# Patient Record
Sex: FEMALE | Race: WHITE | NOT HISPANIC OR LATINO | Employment: OTHER | URBAN - METROPOLITAN AREA
[De-identification: names, ages, dates, MRNs, and addresses within clinical notes are randomized per-mention and may not be internally consistent; named-entity substitution may affect disease eponyms.]

---

## 2017-11-20 ENCOUNTER — APPOINTMENT (OUTPATIENT)
Dept: PREADMISSION TESTING | Facility: HOSPITAL | Age: 77
End: 2017-11-20

## 2017-11-20 ENCOUNTER — HOSPITAL ENCOUNTER (OUTPATIENT)
Dept: GENERAL RADIOLOGY | Facility: HOSPITAL | Age: 77
Discharge: HOME | End: 2017-11-20
Admitting: ORTHOPAEDIC SURGERY

## 2017-11-20 VITALS — HEIGHT: 63 IN | BODY MASS INDEX: 36.84 KG/M2 | WEIGHT: 207.89 LBS

## 2017-11-20 LAB
ANION GAP SERPL CALCULATED.3IONS-SCNC: 8 MMOL/L (ref 3–11)
BUN BLD-MCNC: 16 MG/DL (ref 9–23)
BUN/CREAT SERPL: 20 (ref 7–25)
CALCIUM SPEC-SCNC: 9.2 MG/DL (ref 8.7–10.4)
CHLORIDE SERPL-SCNC: 110 MMOL/L (ref 99–109)
CO2 SERPL-SCNC: 22 MMOL/L (ref 20–31)
CREAT BLD-MCNC: 0.8 MG/DL (ref 0.6–1.3)
DEPRECATED RDW RBC AUTO: 44.7 FL (ref 37–54)
ERYTHROCYTE [DISTWIDTH] IN BLOOD BY AUTOMATED COUNT: 13.2 % (ref 11.3–14.5)
GFR SERPL CREATININE-BSD FRML MDRD: 70 ML/MIN/1.73
GLUCOSE BLD-MCNC: 108 MG/DL (ref 70–100)
HBA1C MFR BLD: 6.1 % (ref 4.8–5.6)
HCT VFR BLD AUTO: 47.6 % (ref 34.5–44)
HGB BLD-MCNC: 15.8 G/DL (ref 11.5–15.5)
MCH RBC QN AUTO: 30.6 PG (ref 27–31)
MCHC RBC AUTO-ENTMCNC: 33.2 G/DL (ref 32–36)
MCV RBC AUTO: 92.1 FL (ref 80–99)
PLATELET # BLD AUTO: 218 10*3/MM3 (ref 150–450)
PMV BLD AUTO: 10 FL (ref 6–12)
POTASSIUM BLD-SCNC: 4.5 MMOL/L (ref 3.5–5.5)
RBC # BLD AUTO: 5.17 10*6/MM3 (ref 3.89–5.14)
SODIUM BLD-SCNC: 140 MMOL/L (ref 132–146)
WBC NRBC COR # BLD: 6.82 10*3/MM3 (ref 3.5–10.8)

## 2017-11-20 PROCEDURE — 93005 ELECTROCARDIOGRAM TRACING: CPT

## 2017-11-20 PROCEDURE — 36415 COLL VENOUS BLD VENIPUNCTURE: CPT

## 2017-11-20 PROCEDURE — 93010 ELECTROCARDIOGRAM REPORT: CPT | Performed by: INTERNAL MEDICINE

## 2017-11-20 PROCEDURE — 83036 HEMOGLOBIN GLYCOSYLATED A1C: CPT | Performed by: ORTHOPAEDIC SURGERY

## 2017-11-20 PROCEDURE — 85027 COMPLETE CBC AUTOMATED: CPT | Performed by: ORTHOPAEDIC SURGERY

## 2017-11-20 PROCEDURE — 71020 HC CHEST PA AND LATERAL: CPT

## 2017-11-20 PROCEDURE — 80048 BASIC METABOLIC PNL TOTAL CA: CPT | Performed by: ORTHOPAEDIC SURGERY

## 2017-11-20 RX ORDER — PRAVASTATIN SODIUM 40 MG
40 TABLET ORAL DAILY
COMMUNITY

## 2017-11-20 NOTE — DISCHARGE INSTRUCTIONS
The following information and instructions were given:    NPO after MN except sips of water with routine prescribed medication (except blood thinner, diabetes, or weight reducing medication) unless otherwise instructed by your physician.  Do not eat, drink, smoke or chew gum after MN the night before surgery. This also includes no mints.    DO NOT shave for two days before your procedure.  Do not wear makeup.      DO NOT wear fingernail polish (gel/regular) and/or acrylic/artificial nails on the day of surgery.   If a patient had recent manicure and would rather not remove polish or artificial nails, then the minimum requirement is that the polish/artificial nails must be removed from the middle finger on each hand.      If patient was having surgery on an upper extremity, then the patient was instructed that fingernail polish/artificial fingernails must be removed for surgery.  NO EXCEPTIONS.      If patient was having surgery on a lower extremity, then the patient was instructed that toenail polish on both extremities must be removed for surgery.  NO EXCEPTIONS.    Remove all jewelry (advised to go to jeweler if unable to remove).  Jewelry especially rings can no longer be taped for surgery.    Leave anything you consider valuable at home.    Leave your suitcase in the car until after your surgery.    Bring the following with you (if applicable)   -picture ID and insurance cards   -Co-pay/deductible required by insurance   -Medications in the original bottles (not a list) including all over-the-counter  medications if not brought to PAT   -Copy of advance directive, living will or power of  documents if not  brought to PAT   -CPAP or BIPAP mask and tubing (do not bring machine)   -Skin prep instructions sheet   -PAT Pass    Education booklet, brochure, handout or binder given to patient.    Pain Control After Surgery handout given to patient.    Respirex use (handout given to patient) and pneumonia  prevention.    Signs and Symptoms of infection.    DVT Prevention stressing the importance of ambulation.    Patient to apply Chlorhexadine wipes to surgical area (as instructed) the night before procedure and the AM of procedure.

## 2017-11-28 ENCOUNTER — TRANSCRIBE ORDERS (OUTPATIENT)
Dept: ADMINISTRATIVE | Facility: HOSPITAL | Age: 77
End: 2017-11-28

## 2017-11-28 DIAGNOSIS — M19.012 ARTHRITIS OF LEFT SHOULDER REGION: Primary | ICD-10-CM

## 2017-11-30 ENCOUNTER — HOSPITAL ENCOUNTER (OUTPATIENT)
Dept: CT IMAGING | Facility: HOSPITAL | Age: 77
Discharge: HOME | End: 2017-11-30
Attending: ORTHOPAEDIC SURGERY | Admitting: ORTHOPAEDIC SURGERY

## 2017-11-30 DIAGNOSIS — M19.012 ARTHRITIS OF LEFT SHOULDER REGION: ICD-10-CM

## 2017-11-30 PROCEDURE — 73200 CT UPPER EXTREMITY W/O DYE: CPT

## 2017-12-03 ENCOUNTER — ANESTHESIA EVENT (OUTPATIENT)
Dept: PERIOP | Facility: HOSPITAL | Age: 77
End: 2017-12-03

## 2017-12-03 RX ORDER — PREGABALIN 75 MG/1
75 CAPSULE ORAL ONCE
Status: DISCONTINUED | OUTPATIENT
Start: 2017-12-04 | End: 2017-12-05 | Stop reason: HOSPADM

## 2017-12-03 RX ORDER — ACETAMINOPHEN 500 MG
1000 TABLET ORAL ONCE
Status: COMPLETED | OUTPATIENT
Start: 2017-12-04 | End: 2017-12-05

## 2017-12-04 ENCOUNTER — HOSPITAL ENCOUNTER (INPATIENT)
Facility: HOSPITAL | Age: 77
LOS: 1 days | Discharge: HOME | End: 2017-12-05
Attending: ORTHOPAEDIC SURGERY | Admitting: ORTHOPAEDIC SURGERY

## 2017-12-04 ENCOUNTER — APPOINTMENT (OUTPATIENT)
Dept: GENERAL RADIOLOGY | Facility: HOSPITAL | Age: 77
End: 2017-12-04

## 2017-12-04 ENCOUNTER — ANESTHESIA (OUTPATIENT)
Dept: PERIOP | Facility: HOSPITAL | Age: 77
End: 2017-12-04

## 2017-12-04 DIAGNOSIS — Z78.9 IMPAIRED MOBILITY AND ADLS: Primary | ICD-10-CM

## 2017-12-04 DIAGNOSIS — Z74.09 IMPAIRED MOBILITY AND ADLS: Primary | ICD-10-CM

## 2017-12-04 PROBLEM — Z96.612 S/P REVERSE TOTAL SHOULDER ARTHROPLASTY, LEFT: Status: ACTIVE | Noted: 2017-12-04

## 2017-12-04 PROBLEM — E78.5 HLD (HYPERLIPIDEMIA): Status: ACTIVE | Noted: 2017-12-04

## 2017-12-04 PROBLEM — R73.03 PREDIABETES: Status: ACTIVE | Noted: 2017-12-04

## 2017-12-04 LAB
GLUCOSE BLDC GLUCOMTR-MCNC: 195 MG/DL (ref 70–130)
GLUCOSE BLDC GLUCOMTR-MCNC: 244 MG/DL (ref 70–130)

## 2017-12-04 PROCEDURE — 0LS40ZZ REPOSITION LEFT UPPER ARM TENDON, OPEN APPROACH: ICD-10-PCS | Performed by: ORTHOPAEDIC SURGERY

## 2017-12-04 PROCEDURE — 25010000002 ROPIVACAINE HCL-NACL 0.2-0.9 % SOLUTION: Performed by: ANESTHESIOLOGY

## 2017-12-04 PROCEDURE — 0RRK00Z REPLACEMENT OF LEFT SHOULDER JOINT WITH REVERSE BALL AND SOCKET SYNTHETIC SUBSTITUTE, OPEN APPROACH: ICD-10-PCS | Performed by: ORTHOPAEDIC SURGERY

## 2017-12-04 PROCEDURE — 82962 GLUCOSE BLOOD TEST: CPT

## 2017-12-04 PROCEDURE — C1776 JOINT DEVICE (IMPLANTABLE): HCPCS | Performed by: ORTHOPAEDIC SURGERY

## 2017-12-04 PROCEDURE — 94799 UNLISTED PULMONARY SVC/PX: CPT

## 2017-12-04 PROCEDURE — 25010000002 VANCOMYCIN: Performed by: ORTHOPAEDIC SURGERY

## 2017-12-04 PROCEDURE — 25010000002 DEXAMETHASONE PER 1 MG: Performed by: NURSE ANESTHETIST, CERTIFIED REGISTERED

## 2017-12-04 PROCEDURE — 63710000001 INSULIN LISPRO (HUMAN) PER 5 UNITS: Performed by: NURSE PRACTITIONER

## 2017-12-04 PROCEDURE — 73030 X-RAY EXAM OF SHOULDER: CPT

## 2017-12-04 PROCEDURE — 25010000002 FENTANYL CITRATE (PF) 100 MCG/2ML SOLUTION: Performed by: NURSE ANESTHETIST, CERTIFIED REGISTERED

## 2017-12-04 PROCEDURE — 97530 THERAPEUTIC ACTIVITIES: CPT | Performed by: OCCUPATIONAL THERAPIST

## 2017-12-04 PROCEDURE — C1713 ANCHOR/SCREW BN/BN,TIS/BN: HCPCS | Performed by: ORTHOPAEDIC SURGERY

## 2017-12-04 PROCEDURE — 25010000003 CEFAZOLIN IN DEXTROSE 2-4 GM/100ML-% SOLUTION: Performed by: ORTHOPAEDIC SURGERY

## 2017-12-04 PROCEDURE — 97166 OT EVAL MOD COMPLEX 45 MIN: CPT | Performed by: OCCUPATIONAL THERAPIST

## 2017-12-04 PROCEDURE — 25010000002 FENTANYL CITRATE (PF) 100 MCG/2ML SOLUTION: Performed by: ANESTHESIOLOGY

## 2017-12-04 PROCEDURE — 25010000002 MIDAZOLAM PER 1 MG: Performed by: ANESTHESIOLOGY

## 2017-12-04 PROCEDURE — 25010000002 VANCOMYCIN 10 G RECONSTITUTED SOLUTION: Performed by: ORTHOPAEDIC SURGERY

## 2017-12-04 PROCEDURE — 25010000002 NEOSTIGMINE PER 0.5 MG: Performed by: NURSE ANESTHETIST, CERTIFIED REGISTERED

## 2017-12-04 PROCEDURE — 25010000002 ONDANSETRON PER 1 MG: Performed by: NURSE ANESTHETIST, CERTIFIED REGISTERED

## 2017-12-04 PROCEDURE — 25010000002 PROPOFOL 10 MG/ML EMULSION: Performed by: NURSE ANESTHETIST, CERTIFIED REGISTERED

## 2017-12-04 PROCEDURE — L3670 SO ACRO/CLAV CAN WEB PRE OTS: HCPCS | Performed by: ORTHOPAEDIC SURGERY

## 2017-12-04 DEVICE — GLENOSPHERE SHLDR/REV EQUINOXE 38MM: Type: IMPLANTABLE DEVICE | Site: SHOULDER | Status: FUNCTIONAL

## 2017-12-04 DEVICE — SCRW COMPR EQUINOXE LK 4.5X22MM: Type: IMPLANTABLE DEVICE | Site: SHOULDER | Status: FUNCTIONAL

## 2017-12-04 DEVICE — LINER HUM EQUINOXE REV SHLDR 38 PLS0: Type: IMPLANTABLE DEVICE | Site: SHOULDER | Status: FUNCTIONAL

## 2017-12-04 DEVICE — TOTL SHLDR AUG PLT ARTHROPLASTY UPCHRG: Type: IMPLANTABLE DEVICE | Site: SHOULDER | Status: FUNCTIONAL

## 2017-12-04 DEVICE — IMPLANTABLE DEVICE: Type: IMPLANTABLE DEVICE | Site: SHOULDER | Status: FUNCTIONAL

## 2017-12-04 DEVICE — SCRW COMPR EQUINOXE LK 4.5X26MM: Type: IMPLANTABLE DEVICE | Site: SHOULDER | Status: FUNCTIONAL

## 2017-12-04 DEVICE — SCRW LK EQUINOXE GLENOSPHERE REV/SHLDR: Type: IMPLANTABLE DEVICE | Site: SHOULDER | Status: FUNCTIONAL

## 2017-12-04 DEVICE — STEM HUM PRI EQUINOXE PRESSFIT 9MM: Type: IMPLANTABLE DEVICE | Site: SHOULDER | Status: FUNCTIONAL

## 2017-12-04 DEVICE — TRY HUM EQUINOXE ADPT REV SHLDR PLS0: Type: IMPLANTABLE DEVICE | Site: SHOULDER | Status: FUNCTIONAL

## 2017-12-04 DEVICE — SCRW EQUINOXE TORQ DEFINE REV SHLDR KT: Type: IMPLANTABLE DEVICE | Site: SHOULDER | Status: FUNCTIONAL

## 2017-12-04 DEVICE — SCRW COMPR EQUINOXE LK 4.5X18MM: Type: IMPLANTABLE DEVICE | Site: SHOULDER | Status: FUNCTIONAL

## 2017-12-04 DEVICE — PLT GLEN JNT EQUINOXE AUG POST 8DEG LT: Type: IMPLANTABLE DEVICE | Site: SHOULDER | Status: FUNCTIONAL

## 2017-12-04 RX ORDER — VANCOMYCIN/0.9 % SOD CHLORIDE 1.5G/250ML
15 PLASTIC BAG, INJECTION (ML) INTRAVENOUS ONCE
Status: COMPLETED | OUTPATIENT
Start: 2017-12-04 | End: 2017-12-04

## 2017-12-04 RX ORDER — LIDOCAINE HYDROCHLORIDE 10 MG/ML
INJECTION, SOLUTION INFILTRATION; PERINEURAL AS NEEDED
Status: DISCONTINUED | OUTPATIENT
Start: 2017-12-04 | End: 2017-12-04 | Stop reason: SURG

## 2017-12-04 RX ORDER — ROPIVACAINE IN 0.9% SOD CHL/PF 0.2% 545ML
6 ELASTOMERIC PUMP, HI VARIABLE RATE INJECTION CONTINUOUS
Status: DISCONTINUED | OUTPATIENT
Start: 2017-12-04 | End: 2017-12-05 | Stop reason: HOSPADM

## 2017-12-04 RX ORDER — NALOXONE HCL 0.4 MG/ML
0.1 VIAL (ML) INJECTION
Status: DISCONTINUED | OUTPATIENT
Start: 2017-12-04 | End: 2017-12-05 | Stop reason: HOSPADM

## 2017-12-04 RX ORDER — ATRACURIUM BESYLATE 10 MG/ML
INJECTION, SOLUTION INTRAVENOUS AS NEEDED
Status: DISCONTINUED | OUTPATIENT
Start: 2017-12-04 | End: 2017-12-04 | Stop reason: SURG

## 2017-12-04 RX ORDER — DEXAMETHASONE SODIUM PHOSPHATE 4 MG/ML
INJECTION, SOLUTION INTRA-ARTICULAR; INTRALESIONAL; INTRAMUSCULAR; INTRAVENOUS; SOFT TISSUE AS NEEDED
Status: DISCONTINUED | OUTPATIENT
Start: 2017-12-04 | End: 2017-12-04 | Stop reason: SURG

## 2017-12-04 RX ORDER — CEFAZOLIN SODIUM 2 G/100ML
2 INJECTION, SOLUTION INTRAVENOUS ONCE
Status: COMPLETED | OUTPATIENT
Start: 2017-12-04 | End: 2017-12-04

## 2017-12-04 RX ORDER — PROPOFOL 10 MG/ML
VIAL (ML) INTRAVENOUS CONTINUOUS PRN
Status: DISCONTINUED | OUTPATIENT
Start: 2017-12-04 | End: 2017-12-04 | Stop reason: SURG

## 2017-12-04 RX ORDER — GLYCOPYRROLATE 0.2 MG/ML
INJECTION INTRAMUSCULAR; INTRAVENOUS AS NEEDED
Status: DISCONTINUED | OUTPATIENT
Start: 2017-12-04 | End: 2017-12-04 | Stop reason: SURG

## 2017-12-04 RX ORDER — FAMOTIDINE 20 MG/1
20 TABLET, FILM COATED ORAL EVERY 12 HOURS SCHEDULED
Status: DISCONTINUED | OUTPATIENT
Start: 2017-12-04 | End: 2017-12-04 | Stop reason: HOSPADM

## 2017-12-04 RX ORDER — ONDANSETRON 2 MG/ML
4 INJECTION INTRAMUSCULAR; INTRAVENOUS ONCE AS NEEDED
Status: DISCONTINUED | OUTPATIENT
Start: 2017-12-04 | End: 2017-12-04 | Stop reason: HOSPADM

## 2017-12-04 RX ORDER — ONDANSETRON 2 MG/ML
4 INJECTION INTRAMUSCULAR; INTRAVENOUS EVERY 6 HOURS PRN
Status: DISCONTINUED | OUTPATIENT
Start: 2017-12-04 | End: 2017-12-05 | Stop reason: HOSPADM

## 2017-12-04 RX ORDER — LIDOCAINE HYDROCHLORIDE 10 MG/ML
0.5 INJECTION, SOLUTION EPIDURAL; INFILTRATION; INTRACAUDAL; PERINEURAL ONCE AS NEEDED
Status: COMPLETED | OUTPATIENT
Start: 2017-12-04 | End: 2017-12-04

## 2017-12-04 RX ORDER — SODIUM CHLORIDE, SODIUM LACTATE, POTASSIUM CHLORIDE, CALCIUM CHLORIDE 600; 310; 30; 20 MG/100ML; MG/100ML; MG/100ML; MG/100ML
9 INJECTION, SOLUTION INTRAVENOUS CONTINUOUS PRN
Status: DISCONTINUED | OUTPATIENT
Start: 2017-12-04 | End: 2017-12-04 | Stop reason: HOSPADM

## 2017-12-04 RX ORDER — SODIUM CHLORIDE, SODIUM LACTATE, POTASSIUM CHLORIDE, CALCIUM CHLORIDE 600; 310; 30; 20 MG/100ML; MG/100ML; MG/100ML; MG/100ML
INJECTION, SOLUTION INTRAVENOUS CONTINUOUS PRN
Status: DISCONTINUED | OUTPATIENT
Start: 2017-12-04 | End: 2017-12-04 | Stop reason: SURG

## 2017-12-04 RX ORDER — BISACODYL 5 MG/1
10 TABLET, DELAYED RELEASE ORAL DAILY PRN
Status: DISCONTINUED | OUTPATIENT
Start: 2017-12-04 | End: 2017-12-05 | Stop reason: HOSPADM

## 2017-12-04 RX ORDER — BUPIVACAINE HYDROCHLORIDE 2.5 MG/ML
INJECTION, SOLUTION EPIDURAL; INFILTRATION; INTRACAUDAL AS NEEDED
Status: DISCONTINUED | OUTPATIENT
Start: 2017-12-04 | End: 2017-12-04 | Stop reason: SURG

## 2017-12-04 RX ORDER — FAMOTIDINE 10 MG/ML
20 INJECTION, SOLUTION INTRAVENOUS EVERY 12 HOURS SCHEDULED
Status: DISCONTINUED | OUTPATIENT
Start: 2017-12-04 | End: 2017-12-04 | Stop reason: HOSPADM

## 2017-12-04 RX ORDER — SODIUM CHLORIDE 450 MG/100ML
50 INJECTION, SOLUTION INTRAVENOUS CONTINUOUS
Status: DISCONTINUED | OUTPATIENT
Start: 2017-12-04 | End: 2017-12-05 | Stop reason: HOSPADM

## 2017-12-04 RX ORDER — PROPOFOL 10 MG/ML
VIAL (ML) INTRAVENOUS AS NEEDED
Status: DISCONTINUED | OUTPATIENT
Start: 2017-12-04 | End: 2017-12-04 | Stop reason: SURG

## 2017-12-04 RX ORDER — SODIUM CHLORIDE 0.9 % (FLUSH) 0.9 %
1-10 SYRINGE (ML) INJECTION AS NEEDED
Status: DISCONTINUED | OUTPATIENT
Start: 2017-12-04 | End: 2017-12-04 | Stop reason: HOSPADM

## 2017-12-04 RX ORDER — ATORVASTATIN CALCIUM 10 MG/1
10 TABLET, FILM COATED ORAL NIGHTLY
Status: DISCONTINUED | OUTPATIENT
Start: 2017-12-04 | End: 2017-12-05 | Stop reason: HOSPADM

## 2017-12-04 RX ORDER — FENTANYL CITRATE 50 UG/ML
50 INJECTION, SOLUTION INTRAMUSCULAR; INTRAVENOUS
Status: DISCONTINUED | OUTPATIENT
Start: 2017-12-04 | End: 2017-12-04 | Stop reason: HOSPADM

## 2017-12-04 RX ORDER — HYDROMORPHONE HYDROCHLORIDE 1 MG/ML
0.5 INJECTION, SOLUTION INTRAMUSCULAR; INTRAVENOUS; SUBCUTANEOUS
Status: DISCONTINUED | OUTPATIENT
Start: 2017-12-04 | End: 2017-12-05 | Stop reason: HOSPADM

## 2017-12-04 RX ORDER — FENTANYL CITRATE 50 UG/ML
INJECTION, SOLUTION INTRAMUSCULAR; INTRAVENOUS AS NEEDED
Status: DISCONTINUED | OUTPATIENT
Start: 2017-12-04 | End: 2017-12-04 | Stop reason: SURG

## 2017-12-04 RX ORDER — HYDROCODONE BITARTRATE AND ACETAMINOPHEN 10; 325 MG/1; MG/1
1 TABLET ORAL EVERY 4 HOURS PRN
Status: DISCONTINUED | OUTPATIENT
Start: 2017-12-04 | End: 2017-12-05 | Stop reason: HOSPADM

## 2017-12-04 RX ORDER — DEXTROSE MONOHYDRATE 25 G/50ML
25 INJECTION, SOLUTION INTRAVENOUS
Status: DISCONTINUED | OUTPATIENT
Start: 2017-12-04 | End: 2017-12-05 | Stop reason: HOSPADM

## 2017-12-04 RX ORDER — DOCUSATE SODIUM 100 MG/1
100 CAPSULE, LIQUID FILLED ORAL 2 TIMES DAILY PRN
Status: DISCONTINUED | OUTPATIENT
Start: 2017-12-04 | End: 2017-12-05 | Stop reason: HOSPADM

## 2017-12-04 RX ORDER — SENNA AND DOCUSATE SODIUM 50; 8.6 MG/1; MG/1
2 TABLET, FILM COATED ORAL 2 TIMES DAILY
Status: DISCONTINUED | OUTPATIENT
Start: 2017-12-04 | End: 2017-12-05 | Stop reason: HOSPADM

## 2017-12-04 RX ORDER — NICOTINE POLACRILEX 4 MG
15 LOZENGE BUCCAL
Status: DISCONTINUED | OUTPATIENT
Start: 2017-12-04 | End: 2017-12-05 | Stop reason: HOSPADM

## 2017-12-04 RX ORDER — MIDAZOLAM HYDROCHLORIDE 1 MG/ML
INJECTION INTRAMUSCULAR; INTRAVENOUS AS NEEDED
Status: DISCONTINUED | OUTPATIENT
Start: 2017-12-04 | End: 2017-12-04 | Stop reason: SURG

## 2017-12-04 RX ORDER — ONDANSETRON 2 MG/ML
INJECTION INTRAMUSCULAR; INTRAVENOUS AS NEEDED
Status: DISCONTINUED | OUTPATIENT
Start: 2017-12-04 | End: 2017-12-04 | Stop reason: SURG

## 2017-12-04 RX ORDER — ESMOLOL HYDROCHLORIDE 10 MG/ML
INJECTION INTRAVENOUS AS NEEDED
Status: DISCONTINUED | OUTPATIENT
Start: 2017-12-04 | End: 2017-12-04 | Stop reason: SURG

## 2017-12-04 RX ORDER — MAGNESIUM HYDROXIDE 1200 MG/15ML
LIQUID ORAL AS NEEDED
Status: DISCONTINUED | OUTPATIENT
Start: 2017-12-04 | End: 2017-12-04 | Stop reason: HOSPADM

## 2017-12-04 RX ORDER — HYDRALAZINE HYDROCHLORIDE 20 MG/ML
10 INJECTION INTRAMUSCULAR; INTRAVENOUS EVERY 6 HOURS PRN
Status: DISCONTINUED | OUTPATIENT
Start: 2017-12-04 | End: 2017-12-05 | Stop reason: HOSPADM

## 2017-12-04 RX ADMIN — EPHEDRINE SULFATE 10 MG: 50 INJECTION INTRAMUSCULAR; INTRAVENOUS; SUBCUTANEOUS at 10:01

## 2017-12-04 RX ADMIN — FAMOTIDINE 20 MG: 20 TABLET, FILM COATED ORAL at 07:22

## 2017-12-04 RX ADMIN — ESMOLOL HYDROCHLORIDE 30 MG: 10 INJECTION, SOLUTION INTRAVENOUS at 09:28

## 2017-12-04 RX ADMIN — BUPIVACAINE HYDROCHLORIDE 20 ML: 2.5 INJECTION, SOLUTION EPIDURAL; INFILTRATION; INTRACAUDAL; PERINEURAL at 08:02

## 2017-12-04 RX ADMIN — LIDOCAINE HYDROCHLORIDE 50 MG: 10 INJECTION, SOLUTION INFILTRATION; PERINEURAL at 09:28

## 2017-12-04 RX ADMIN — SODIUM CHLORIDE, POTASSIUM CHLORIDE, SODIUM LACTATE AND CALCIUM CHLORIDE 9 ML/HR: 600; 310; 30; 20 INJECTION, SOLUTION INTRAVENOUS at 07:13

## 2017-12-04 RX ADMIN — GLYCOPYRROLATE 0.4 MG: 0.2 INJECTION, SOLUTION INTRAMUSCULAR; INTRAVENOUS at 10:40

## 2017-12-04 RX ADMIN — Medication 2 TABLET: at 17:23

## 2017-12-04 RX ADMIN — INSULIN LISPRO 3 UNITS: 100 INJECTION, SOLUTION INTRAVENOUS; SUBCUTANEOUS at 17:23

## 2017-12-04 RX ADMIN — ONDANSETRON 4 MG: 2 INJECTION INTRAMUSCULAR; INTRAVENOUS at 10:40

## 2017-12-04 RX ADMIN — Medication 6 ML/HR: at 11:05

## 2017-12-04 RX ADMIN — FENTANYL CITRATE 50 MCG: 50 INJECTION, SOLUTION INTRAMUSCULAR; INTRAVENOUS at 07:55

## 2017-12-04 RX ADMIN — DEXAMETHASONE SODIUM PHOSPHATE 8 MG: 4 INJECTION, SOLUTION INTRAMUSCULAR; INTRAVENOUS at 09:39

## 2017-12-04 RX ADMIN — EPHEDRINE SULFATE 15 MG: 50 INJECTION INTRAMUSCULAR; INTRAVENOUS; SUBCUTANEOUS at 09:42

## 2017-12-04 RX ADMIN — SODIUM CHLORIDE, POTASSIUM CHLORIDE, SODIUM LACTATE AND CALCIUM CHLORIDE: 600; 310; 30; 20 INJECTION, SOLUTION INTRAVENOUS at 09:22

## 2017-12-04 RX ADMIN — Medication 3 MG: at 10:40

## 2017-12-04 RX ADMIN — CEFAZOLIN SODIUM 2 G: 2 INJECTION, SOLUTION INTRAVENOUS at 07:32

## 2017-12-04 RX ADMIN — MIDAZOLAM HYDROCHLORIDE 1 MG: 1 INJECTION, SOLUTION INTRAMUSCULAR; INTRAVENOUS at 07:55

## 2017-12-04 RX ADMIN — LIDOCAINE HYDROCHLORIDE 0.2 ML: 10 INJECTION, SOLUTION EPIDURAL; INFILTRATION; INTRACAUDAL; PERINEURAL at 07:13

## 2017-12-04 RX ADMIN — PROPOFOL 150 MG: 10 INJECTION, EMULSION INTRAVENOUS at 09:28

## 2017-12-04 RX ADMIN — INSULIN LISPRO 2 UNITS: 100 INJECTION, SOLUTION INTRAVENOUS; SUBCUTANEOUS at 21:15

## 2017-12-04 RX ADMIN — SODIUM CHLORIDE 50 ML/HR: 4.5 INJECTION, SOLUTION INTRAVENOUS at 13:38

## 2017-12-04 RX ADMIN — VANCOMYCIN HYDROCHLORIDE 1500 MG: 10 INJECTION, POWDER, LYOPHILIZED, FOR SOLUTION INTRAVENOUS at 20:04

## 2017-12-04 RX ADMIN — PROPOFOL 133 MCG/KG/MIN: 10 INJECTION, EMULSION INTRAVENOUS at 09:28

## 2017-12-04 RX ADMIN — VANCOMYCIN HYDROCHLORIDE 1500 MG: 1 INJECTION, POWDER, LYOPHILIZED, FOR SOLUTION INTRAVENOUS at 08:41

## 2017-12-04 RX ADMIN — ATRACURIUM BESYLATE 50 MG: 10 INJECTION, SOLUTION INTRAVENOUS at 09:28

## 2017-12-04 RX ADMIN — FENTANYL CITRATE 50 MCG: 50 INJECTION INTRAMUSCULAR; INTRAVENOUS at 11:50

## 2017-12-04 NOTE — OP NOTE
DATE OF OPERATION: 12/04/17  PREOPERATIVE DIAGNOSIS: Left shoulder arthritis.    POSTOPERATIVE DIAGNOSES:  1. Left shoulder osteoarthritis with severe posterior glenoid wear  2. Biceps tenosynovitis.    PROCEDURES PERFORMED:  1. Left reverse total shoulder arthroplasty.    2. Left biceps tenodesis.    SURGEON: Carl Jarrett MD  ASSISTANTS:  1. MINDY Anand, medically necessary.    ANESTHESIA: General plus block.    ESTIMATED BLOOD LOSS:100mL.    COMPLICATIONS: None.    DISPOSITION: Recovery room in stable condition.    IMPLANTS: [employer]ech Equinoxe reverse total shoulder system, 9 mm stem press-fit, 0 metal liner tray, 38-0 polyethylene tray, posterior augmented baseplate with 4 screws with locking caps, and a 38mm glenosphere.    INDICATIONS: This is a 77-year-old female with left shoulder pain and limited function and motion secondary to arthritis. They have failed conservative treatment and after a discussion of risks, benefits, and alternatives, wished to proceed with shoulder arthroplasty.  DESCRIPTION OF PROCEDURE: On the day of surgery, the patient identified the left shoulder as the correct operative extremity. This was initialed by the surgeon with the patients's acknowledgment. The patient underwent placement of an interscalene block and was taken to the operating room and placed in the supine position. Upon induction of adequate anesthesia, the patient was brought up to the beach chair position and the shoulder and upper extremity were prepped and draped in the usual sterile fashion. Timeout confirmed the correct patient and operative extremity as well as that antibiotics were on board. A standard deltopectoral approach to the shoulder was carried out. It was carried sharply through the skin and subcutaneous tissue. Medial and lateral flaps were developed over the deltopectoral fascia. The cephalic vein was identified and mobilized medially with the deltoid. The subdeltoid and subpectoral spaces were  mobilized and a blunt retractor was placed deep to this. The clavipectoral fascia was opened on the lateral edge of the conjoined tendon and the retractor was moved deep to this. The leading edge of the pectoralis was released exposing the long head of the biceps. This was tenosynovitic. It was tenodesed to the pectoralis and released proximal to this. The clavipectoral fascia was opened on the lateral edge of conjoined tendon and the retractor moved deep to this. The 3 sisters were identified and coagulated. A subscapularis tenotomy was performed 1 cm medial to the lesser tuberosity and rotator interval was released to the glenoid exposing the humeral head. The inferior capsule was released directly off the humerus to allow greater than 90° of external rotation. The anatomic neck was exposed and the humeral head osteotomy was performed in approximately 25° of retroversion. The remainder of the osteophytes were removed. The canal was then entered, reamed, and broached. The final stem impacted in in approximately 25° of retroversion. A head protector was placed. The humerus was subluxed posteriorly. The glenoid exposed. Circumferential labral excision and capsular release were performed. A 270° mobilization of the subscapularis was carried out as well.  The subscap was of very poor quality and did not mobilize well.  This, combined with the nearly 20 degrees of posterior glenoid wear, led me to perform a reverse arthroplasty. A centering hole was drilled. The glenoid was gently reamed slightly eccentrically and then the large central hole for the baseplate was drilled and the cage glenoid baseplate impacted in.  Next, the inferior screw hole was drilled and a screw placed with excellent purchase.  Next, an mark-inferior screw was placed, then a postero-inferior screw, then an mark-superior screw placed with acceptable purchase in all.  Locking caps were placed. The glenosphere was then inserted and locked into  place with a set screw.  The humerus was carefully subluxed back anteriorly. A liner tray and polyethylene were placed and trialing was carried out. The appropriate final sizes were chosen and locked into place.  The shoulder was then reduced.  This allowed nearly full passive range of motion with no instability. The joint was copiously irrigated with orthopedic irrigation mixed with Betadine after the final implants were assembled and locked into place.  Passive range range of motion will be full elevation but external rotation will be limited to 30° in the perioperative period. The deltopectoral interval was approximated with 0 Vicryl, the subcutaneous tissue with 2-0 Vicryl, and the skin with Monocryl and Dermabond. A sterile dressing was placed. Anesthesia was reversed and the patient was taken to the recovery room in stable condition. All instrument, needle, and sponge counts were correct.      Carl Jarrett MD*

## 2017-12-04 NOTE — ANESTHESIA POSTPROCEDURE EVALUATION
Patient: Cheryle Newsome    Procedure Summary     Date Anesthesia Start Anesthesia Stop Room / Location    12/04/17 0922 1114 [facility] / [facility]       Procedure Diagnosis Surgeon Provider    TOTAL SHOULDER ARTHROPLASTY, POSS REVERSE, LEFT (Left Shoulder) Primary localized osteoarthrosis of shoulder, left; Tendonitis, bicipital, left  (L shoulder arthritis) MD Yan Adam MD          Anesthesia Type: general, regional  Last vitals  /69   Temp   97.0   Pulse   72   Resp   14    SpO2   92     Post Anesthesia Care and Evaluation    Patient location during evaluation: PACU  Patient participation: complete - patient cannot participate  Level of consciousness: sleepy but conscious  Pain score: 0  Pain management: adequate  Airway patency: patent  Anesthetic complications: No anesthetic complications  PONV Status: none  Cardiovascular status: hemodynamically stable and acceptable  Respiratory status: nonlabored ventilation, acceptable, nasal cannula and oral airway  Hydration status: acceptable

## 2017-12-04 NOTE — ANESTHESIA PREPROCEDURE EVALUATION
Anesthesia Evaluation     Patient summary reviewed and Nursing notes reviewed   no history of anesthetic complications:  NPO Solid Status: > 8 hours  NPO Liquid Status: > 2 hours     Airway   Mallampati: I  TM distance: >3 FB  Neck ROM: full  no difficulty expected  Dental - normal exam     Pulmonary    (+) decreased breath sounds,   Cardiovascular   Exercise tolerance: good (4-7 METS)    ECG reviewed  Rhythm: regular  Rate: normal    (+) hyperlipidemia      Neuro/Psych  GI/Hepatic/Renal/Endo    (+) obesity,    (-) morbid obesity    Musculoskeletal     Abdominal   (+) obese,     Abdomen: soft.   Substance History      OB/GYN          Other   (+) arthritis                                   Anesthesia Plan    ASA 2     general and regional     intravenous induction   Anesthetic plan and risks discussed with patient.    Plan discussed with CRNA.

## 2017-12-04 NOTE — PLAN OF CARE
Problem: Perioperative Period (Adult)  Goal: Signs and Symptoms of Listed Potential Problems Will be Absent or Manageable (Perioperative Period)  Outcome: Outcome(s) achieved Date Met:  12/04/17

## 2017-12-04 NOTE — ANESTHESIA PROCEDURE NOTES
Peripheral Block    Patient location during procedure: pre-op  Start time: 12/4/2017 7:55 AM  Stop time: 12/4/2017 8:05 AM  Reason for block: at surgeon's request and post-op pain management  Performed by  Anesthesiologist: HUNG NARANJO  Assisted by: LUIS E CUEVAS  Preanesthetic Checklist  Completed: patient identified, site marked, surgical consent, pre-op evaluation, timeout performed, IV checked, risks and benefits discussed and monitors and equipment checked  Prep:  Pt Position: supine  Sterile barriers:cap, gloves, mask and sterile barriers  Prep: ChloraPrep  Patient monitoring: blood pressure monitoring, continuous pulse oximetry and EKG  Procedure  Sedation:yes    Guidance:ultrasound guided  Images:still images obtained    Block Type:interscalene  Injection Technique:catheter  Needle Type:Tuohy and echogenic  Needle Gauge:18 G    Catheter Size:20 G (20g)  Medications  Local Injected:bupivacaine 0.25% Local Amount Injected:20mL  Post Assessment  Injection Assessment: negative aspiration for heme, no paresthesia on injection and incremental injection  Patient Tolerance:comfortable throughout block  Complications:no  Additional Notes  Procedure:                 The pt was placed in semifowlers position with a slight tilt of the thorax contralateral to the insertion site.  The Insertion Site was prepped and draped in sterile fashion.  The pt was anesthetized with  IV Sedation( see meds) and  Skin and cutaneous tissue was infiltrated and anesthetized with 1% Lidocaine 3 mls via a 25g needle.  Utilizing ultrasound guidance, a BBraun 2 inch 18 g Contiplex echogenic touhy needle was advanced in-plane.  Hydro dissection of tissue was achieved with Normal saline. Major vessels(carotid and Internal Jugular) where visualized as the brachial plexus was approached at the approximate level of C-7/ T-1.  Cervical 5 and Branches of Cervical 6 nerve roots where visualized and the needle tip was placed posterior at  the level of C-6 roots.  LA spread was visualized and injection was made incrementally every 5 mls with aspiration. Injection pressure was normal or little, there was no intraneural injection, no vascular injection.      The BBraun 20 g wire stylet  catheter was then placed under US guidance on the posterior aspect of the Brachial Plexus.  Location of catheter was confirmed with NS injection visualized with US . The tuohy was then removed and the skin was sealed with Skin AFix at catheter insertion site.  Skin was prepped with mastisol and the labeled catheter  was secured with steristrips and a CHG tegaderm. Thank You.

## 2017-12-04 NOTE — H&P
Patient Care Team:      Chief complaint:  L shoulder Pain  Subjective:    Patient is a 77 y.o.female presents with L Shoulder Pain   Review of Systems:  General ROS: negative  Cardiovascular ROS: no chest pain or dyspnea on exertion  Respiratory ROS: no cough, shortness of breath, or wheezing      Allergies: No Known Allergies       Latex: no  Contrast Dye: no    Home Meds    Prescriptions Prior to Admission   Medication Sig Dispense Refill Last Dose   • pravastatin (PRAVACHOL) 40 MG tablet Take 40 mg by mouth Daily.        PMH:   Past Medical History:   Diagnosis Date   • Arthritis    • High cholesterol    • History of depression     Related to anesthesia    • History of urinary tract surgery    • Obesity    • Osteoarthritis      PSH:    Past Surgical History:   Procedure Laterality Date   • HYSTERECTOMY     • JOINT REPLACEMENT Bilateral     Hips and Knees     Immunization History: pneumo: no   Flu: no  Tetanus ?  Social History:   Tobacco: no   Alcohol: no      Physical Exam:There were no vitals taken for this visit.       General Appearance:    Alert, obese, cooperative, no distress, appears stated age   Head:    Normocephalic, without obvious abnormality, atraumatic   Lungs:     Clear to auscultation bilaterally, respirations unlabored    Heart:  Regular rate and rhythm, S1 and S2 normal, no murmur, rub    or gallop    Abdomen:    Soft without tenderness   Breast Exam:    deferred   Genitalia:    deferred   Extremities:   Extremities normal, atraumatic, no cyanosis or edema   Skin:   Skin color, texture, turgor normal, no rashes or lesions   Neurologic:   Grossly intact       Cancer Patient: __ yes __no __unknown; If yes, clinical stage T:__ N:__M:__, stage group    Impression: Osteoarthritis L Shoulder    Plan: L PEDRO, Possible Reverse  Jonatan Gilliam PA-C 12/4/2017 7:03 AM

## 2017-12-04 NOTE — BRIEF OP NOTE
TOTAL SHOULDER ARTHROPLASTY  Progress Note    Cheryle Newsome  12/4/2017    Pre-op Diagnosis:   L shoulder arthritis       Post-Op Diagnosis Codes:     * Primary localized osteoarthrosis of shoulder, left [M19.012]     * Tendonitis, bicipital, left [M75.22]    Procedure/CPT® Codes:  OH RECONSTR TOTAL SHOULDER IMPLANT [93769]  OH REPAIR BICEPS LONG TENDON [50297]    Procedure(s):  TOTAL SHOULDER ARTHROPLASTY, POSS REVERSE, LEFT    Surgeon(s):  Carl Jarrett MD    Anesthesia: General with Block    Staff:   Circulator: Fernanda Parsons RN  Scrub Person: Birgit Rich  Vendor Representative: Alber Granda  Nursing Assistant: Lisa Jean Baptiste  Assistant: Jonatan Gilliam PA-C    Estimated Blood Loss: 100 mL    Urine Voided: 0 mL    Specimens:                None      Drains:           Findings: per dictation    Complications: cephalic vein ligation      Carl Jarrett MD     Date: 12/4/2017  Time: 10:44 AM

## 2017-12-04 NOTE — H&P
"Patient Name: Cheryle Newsome  MRN: [id number]  : 1940  DOS: 2017    Attending: Carl Jarrett MD    Primary Care Provider: Chivo Martini MD      Chief complaint:    Left shoulder Pain    Subjective   Patient is a 77 y.o. female presented for Left reverse total shoulder arthroplasty by Dr. Jarrett under GA. She tolerated surgery well and is admitted for further medical management.  Her shoulder has been painful for many years.  She has had limited range of motion.  Conservative treatments failed to provide long-term pain relief.    When seen postop she is doing well.  She denies pain. She denies nausea, shortness of breath or chest pain. No hx of DVT or PE.    ( Above is noted, agree.  Seen in her room afterwards.  She is doing well, good pain control, no other complaints.)wy       Allergies:  No Known Allergies    Meds:  Prescriptions Prior to Admission   Medication Sig Dispense Refill Last Dose   • pravastatin (PRAVACHOL) 40 MG tablet Take 40 mg by mouth Daily.   12/3/2017 at Unknown time       History:   Past Medical History:   Diagnosis Date   • Arthritis    • High cholesterol    • History of depression     Related to anesthesia    • History of urinary tract surgery    • Obesity    • Osteoarthritis      Past Surgical History:   Procedure Laterality Date   • HYSTERECTOMY     • JOINT REPLACEMENT Bilateral     Hips and Knees     History reviewed. No pertinent family history.  Social History   Substance Use Topics   • Smoking status: Never Smoker   • Smokeless tobacco: Never Used   • Alcohol use No    with one child.  She is retired from [employer].    Review of Systems  All systems were reviewed and negative except for:  Gastrointestinal: postitive for  constipation    Vital Signs  /96 (BP Location: Right arm, Patient Position: Lying)  Pulse 84  Temp 98.1 °F (36.7 °C) (Oral)   Resp 17  Ht 63\" (160 cm)  Wt 207 lb (93.9 kg)  SpO2 95%  BMI 36.67 kg/m2    Physical " Exam:    General Appearance:    Alert, cooperative, in no acute distress   Head:    Normocephalic, without obvious abnormality, atraumatic   Eyes:            Lids and lashes normal, conjunctivae and sclerae normal, no   icterus, no pallor, corneas clear, PERRLA   Ears:    Ears appear intact with no abnormalities noted   Neck:   No adenopathy, supple, trachea midline, no thyromegaly, no     carotid bruit, no JVD   Lungs:     Clear to auscultation,respirations regular, even and                   unlabored    Heart:    Regular rhythm and normal rate, normal S1 and S2, no            murmur, no gallop, no rub, no click   Abdomen:     Normal bowel sounds, no masses, no organomegaly, soft        non-tender, non-distended, no guarding, no rebound                 tenderness   Genitalia:    Deferred   Extremities:   Left shoulder Aquacel clean dry intact.  LUE in sling.  Good movement cap refill left digits.     Pulses:   Pulses palpable and equal bilaterally   Skin:   No bleeding, bruising or rash   Neurologic:   Cranial nerves 2 - 12 grossly intact, Numbness left digits.        I reviewed the patient's new clinical results.     Results for JOSE MEYER (MRN [id number]) as of 12/4/2017 13:43   Ref. Range 11/20/2017 10:56   Glucose Latest Ref Range: 70 - 100 mg/dL 108 (H)   Sodium Latest Ref Range: 132 - 146 mmol/L 140   Potassium Latest Ref Range: 3.5 - 5.5 mmol/L 4.5   CO2 Latest Ref Range: 20.0 - 31.0 mmol/L 22.0   Chloride Latest Ref Range: 99 - 109 mmol/L 110 (H)   Anion Gap Latest Ref Range: 3.0 - 11.0 mmol/L 8.0   Creatinine Latest Ref Range: 0.60 - 1.30 mg/dL 0.80   BUN Latest Ref Range: 9 - 23 mg/dL 16   BUN/Creatinine Ratio Latest Ref Range: 7.0 - 25.0  20.0   Calcium Latest Ref Range: 8.7 - 10.4 mg/dL 9.2   eGFR Non African Amer Latest Ref Range: >60 mL/min/1.73 70   Hemoglobin A1C Latest Ref Range: 4.80 - 5.60 % 6.10 (H)   WBC Latest Ref Range: 3.50 - 10.80 10*3/mm3 6.82   RBC Latest Ref Range: 3.89 - 5.14  10*6/mm3 5.17 (H)   Hemoglobin Latest Ref Range: 11.5 - 15.5 g/dL 15.8 (H)   Hematocrit Latest Ref Range: 34.5 - 44.0 % 47.6 (H)   RDW Latest Ref Range: 11.3 - 14.5 % 13.2   MCV Latest Ref Range: 80.0 - 99.0 fL 92.1   MCH Latest Ref Range: 27.0 - 31.0 pg 30.6   MCHC Latest Ref Range: 32.0 - 36.0 g/dL 33.2   MPV Latest Ref Range: 6.0 - 12.0 fL 10.0   Platelets Latest Ref Range: 150 - 450 10*3/mm3 218     Assessment and Plan:   Principal Problem:    S/P reverse total shoulder arthroplasty, left  Active Problems:    HLD (hyperlipidemia)    Prediabetes    Osteoarthritis, previous 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 PT/OT- LUE sling, pendulum exercises  2. Pain control-prns, interscalene nerve block  3. IS-encourage  4. DVT proph- [facility]  5. Bowel regimen  6. Resume home medications as appropriate  7. Monitor post-op labs  8. DC planning for home, likely tomorrow    HLD  - Continue statin    PreDM  - hgb A1c on 11/20/17 6.1  - Accuchecks ACHS with low dose SSI  - DM educator consult    Seen and examined by me. Agree with above. Discussed with patient and .monica Hooker MD  12/04/17  5:35 PM

## 2017-12-04 NOTE — ANESTHESIA PROCEDURE NOTES
Airway  Urgency: elective    Airway not difficult    General Information and Staff    Patient location during procedure: OR  CRNA: MALIKA MASSEY    Indications and Patient Condition  Indications for airway management: airway protection    Preoxygenated: yes  MILS not maintained throughout  Mask difficulty assessment: 1 - vent by mask    Final Airway Details  Final airway type: endotracheal airway      Successful airway: ETT  Cuffed: yes   Successful intubation technique: direct laryngoscopy  Facilitating devices/methods: intubating stylet  Endotracheal tube insertion site: oral  Blade: Mcfarlane  Blade size: #2  ETT size: 7.0 mm  Cormack-Lehane Classification: grade I - full view of glottis  Placement verified by: chest auscultation and capnometry   Measured from: lips  ETT to lips (cm): 20  Number of attempts at approach: 1    Additional Comments  Negative epigastric sounds, Breath sound equal bilaterally with symmetric chest rise and fall.  Teeth intact, atraumatic

## 2017-12-05 VITALS
TEMPERATURE: 97.7 F | WEIGHT: 207 LBS | SYSTOLIC BLOOD PRESSURE: 170 MMHG | RESPIRATION RATE: 16 BRPM | BODY MASS INDEX: 36.68 KG/M2 | DIASTOLIC BLOOD PRESSURE: 85 MMHG | HEIGHT: 63 IN | HEART RATE: 88 BPM | OXYGEN SATURATION: 94 %

## 2017-12-05 LAB
BASOPHILS # BLD AUTO: 0.01 10*3/MM3 (ref 0–0.2)
BASOPHILS NFR BLD AUTO: 0.1 % (ref 0–1)
DEPRECATED RDW RBC AUTO: 46.1 FL (ref 37–54)
EOSINOPHIL # BLD AUTO: 0 10*3/MM3 (ref 0–0.3)
EOSINOPHIL NFR BLD AUTO: 0 % (ref 0–3)
ERYTHROCYTE [DISTWIDTH] IN BLOOD BY AUTOMATED COUNT: 13.5 % (ref 11.3–14.5)
GLUCOSE BLDC GLUCOMTR-MCNC: 114 MG/DL (ref 70–130)
GLUCOSE BLDC GLUCOMTR-MCNC: 99 MG/DL (ref 70–130)
HCT VFR BLD AUTO: 40.2 % (ref 34.5–44)
HGB BLD-MCNC: 12.6 G/DL (ref 11.5–15.5)
IMM GRANULOCYTES # BLD: 0.01 10*3/MM3 (ref 0–0.03)
IMM GRANULOCYTES NFR BLD: 0.1 % (ref 0–0.6)
LYMPHOCYTES # BLD AUTO: 1.26 10*3/MM3 (ref 0.6–4.8)
LYMPHOCYTES NFR BLD AUTO: 14.9 % (ref 24–44)
MCH RBC QN AUTO: 29.1 PG (ref 27–31)
MCHC RBC AUTO-ENTMCNC: 31.3 G/DL (ref 32–36)
MCV RBC AUTO: 92.8 FL (ref 80–99)
MONOCYTES # BLD AUTO: 1.28 10*3/MM3 (ref 0–1)
MONOCYTES NFR BLD AUTO: 15.1 % (ref 0–12)
NEUTROPHILS # BLD AUTO: 5.9 10*3/MM3 (ref 1.5–8.3)
NEUTROPHILS NFR BLD AUTO: 69.8 % (ref 41–71)
PLATELET # BLD AUTO: 208 10*3/MM3 (ref 150–450)
PMV BLD AUTO: 10.4 FL (ref 6–12)
RBC # BLD AUTO: 4.33 10*6/MM3 (ref 3.89–5.14)
WBC NRBC COR # BLD: 8.46 10*3/MM3 (ref 3.5–10.8)

## 2017-12-05 PROCEDURE — 94799 UNLISTED PULMONARY SVC/PX: CPT

## 2017-12-05 PROCEDURE — 82962 GLUCOSE BLOOD TEST: CPT

## 2017-12-05 PROCEDURE — 85025 COMPLETE CBC W/AUTO DIFF WBC: CPT | Performed by: ORTHOPAEDIC SURGERY

## 2017-12-05 PROCEDURE — 97530 THERAPEUTIC ACTIVITIES: CPT | Performed by: OCCUPATIONAL THERAPIST

## 2017-12-05 RX ORDER — ROPIVACAINE IN 0.9% SOD CHL/PF 0.2% 545ML
6 ELASTOMERIC PUMP, HI VARIABLE RATE INJECTION CONTINUOUS
Start: 2017-12-05

## 2017-12-05 RX ORDER — HYDROCODONE BITARTRATE AND ACETAMINOPHEN 10; 325 MG/1; MG/1
1 TABLET ORAL EVERY 4 HOURS PRN
Qty: 30 TABLET | Refills: 0 | Status: SHIPPED | COMMUNITY
Start: 2017-12-05 | End: 2017-12-14

## 2017-12-05 RX ORDER — PSEUDOEPHEDRINE HCL 30 MG
1 TABLET ORAL 2 TIMES DAILY PRN
Qty: 60 CAPSULE | Refills: 0 | Status: SHIPPED | OUTPATIENT
Start: 2017-12-05

## 2017-12-05 RX ADMIN — DOCUSATE SODIUM 100 MG: 100 CAPSULE, LIQUID FILLED ORAL at 08:31

## 2017-12-05 RX ADMIN — ACETAMINOPHEN 1000 MG: 500 TABLET ORAL at 07:32

## 2017-12-05 NOTE — PLAN OF CARE
Problem: Patient Care Overview (Adult)  Goal: Plan of Care Review  Outcome: Ongoing (interventions implemented as appropriate)    12/04/17 1940   Coping/Psychosocial Response Interventions   Plan Of Care Reviewed With patient;spouse   Patient Care Overview   Progress improving   Outcome Evaluation   Outcome Summary/Follow up Plan Pt c/o pain 1/10 at axilla region and ON-Q running at 6. No desaturation on RA post ambulation. Pt passed mobility screen, please DC PT order and pt in agreement. Pt tolerated PROM , IR chest and ER 30 with good teachback from spouse. Recommend DC home with assist from spouse when medically ready.          Problem: Inpatient Occupational Therapy  Goal: Transfer Training Goal 1 LTG- OT  Outcome: Outcome(s) achieved Date Met:  12/04/17 12/04/17 1940   Transfer Training OT LTG   Transfer Training OT LTG, Date Established 12/04/17   Transfer Training OT LTG, Time to Achieve by discharge   Transfer Training OT LTG, Activity Type sit to stand/stand to sit;toilet   Transfer Training OT LTG, [place] Level verbal cues required;contact guard assist   Transfer Training OT LTG, Outcome goal met       Goal: Range of Motion Goal LTG- OT  Outcome: Ongoing (interventions implemented as appropriate)    12/04/17 1940   Range of Motion OT LTG   Range of Motion Goal OT LTG, Date Established 12/04/17   Range of Motion Goal OT LTG, Time to Achieve by discharge   Range of Motion Goal OT LTG, AROM Measure Pt and spouse will complete LUE HEP within physician parameters with supervision in preparation for safe DC home.       Goal: Patient Education Goal LTG- OT  Outcome: Ongoing (interventions implemented as appropriate)    12/04/17 1940   Patient Education OT LTG   Patient Education OT LTG, Date Established 12/04/17   Patient Education OT LTG, Time to Achieve by discharge   Patient Education OT LTG, Education Type written program;HEP;precautions per surgeon;1 hand/niranjan technique;home safety   Patient  Education OT LTG, Education Understanding demonstrates adequately;verbalizes understanding       Goal: UB Dressing Goal LTG- OT  Outcome: Ongoing (interventions implemented as appropriate)    12/04/17 1940   UB Dressing OT LTG   UB Dressing Goal OT LTG, Date Established 12/04/17   UB Dressing Goal OT LTG, Time to Achieve by discharge   UB Dressing Goal OT LTG, Activity Type Pt and spouse will don/doff LUE sling with min assist in preparation for safe DC home

## 2017-12-05 NOTE — THERAPY DISCHARGE NOTE
Acute Care - Occupational Therapy Treatment Note/Discharge   [place]     Patient Name: Cheryle Newsome  : 1940  MRN: [id number]  Today's Date: 2017  Onset of Illness/Injury or Date of Surgery Date: 17  Date of Referral to OT: 17  Referring Physician: Dr. Jarrett      Admit Date: 2017    Visit Dx:     ICD-10-CM ICD-9-CM   1. Impaired mobility and ADLs Z74.09 799.89     Patient Active Problem List   Diagnosis   • S/P reverse total shoulder arthroplasty, left   • HLD (hyperlipidemia)   • Prediabetes             Adult Rehabilitation Note       17 1045          Rehab Assessment/Intervention    Discipline occupational therapist  -AR      Document Type therapy note (daily note);discharge summary   POD#1 left RTSA  -AR      Subjective Information no complaints;agree to therapy  -AR      Patient Effort, Rehab Treatment excellent  -AR      Symptoms Noted During/After Treatment other (see comments)   left ptosis noted, spouse concerned-RN notified  -AR      Precautions/Limitations non-weight bearing status;shoulder precautions;other (see comments)   interscalene nerve catheter  -AR      Equipment Issued to Patient bathing equipment;bedside commode   LH sponge, reacher  -AR      Recorded by [AR] Kalee Albarado OT      Vital Signs    Pre SpO2 (%) 94  -AR      O2 Delivery Pre Treatment room air  -AR      Intra SpO2 (%) 93  -AR      O2 Delivery Intra Treatment room air  -AR      Post SpO2 (%) 95  -AR      O2 Delivery Post Treatment room air  -AR      Recorded by [AR] Kalee Albarado OT      Pain Assessment    Pain Assessment 0-10  -AR      Pain Score 1  -AR      Post Pain Score 1  -AR      Pain Type Acute pain  -AR      Pain Location Shoulder  -AR      Pain Orientation Left  -AR      Pain Intervention(s) Repositioned;Ambulation/increased activity  -AR      Response to Interventions tolerated  -AR      Recorded by [AR] Kalee Albarado OT      Cognitive Assessment/Intervention     "Current Cognitive/Communication Assessment functional  -AR      Orientation Status oriented x 4  -AR      Follows Commands/Answers Questions 100% of the time;able to follow multi-step instructions  -AR      Personal Safety WNL/WFL  -AR      Personal Safety Interventions gait belt;nonskid shoes/slippers when out of bed;supervised activity  -AR      Recorded by [AR] Kalee Albarado OT      Mobility Assessment/Training    Extremity Weight-Bearing Status left upper extremity  -AR      Left Upper Extremity Weight-Bearing non weight-bearing  -AR      Recorded by [AR] Kalee Albarado OT      Bed Mobility, Assessment/Treatment    Bed Mobility, Comment pt up in chair and anticipates sleeping in recliner  -AR      Recorded by [AR] Kalee Albarado OT      Transfer Assessment/Treatment    Transfers, Sit-Stand [place] independent  -AR      Transfers, Stand-Sit [place] independent  -AR      Recorded by [AR] Kalee Albarado OT      Stairs Assessment/Treatment    Number of Stairs 5  -AR      Stairs, Handrail Location none  -AR      Stairs, [place] Level independent  -AR      Stairs, Technique Used step over step (ascending);step over step (descending)  -AR      Recorded by [AR] Kalee Albarado OT      Functional Mobility    Functional Mobility- Ind. Level independent  -AR      Functional Mobility-Distance (Feet) 300  -AR      Functional Mobility- Comment No dyspnea, no desaturation noted however pt c/o feeling as if left nostril was \"stopped up\"  -AR      Recorded by [AR] Kalee Albarado OT      Positioning and Restraints    Pre-Treatment Position sitting in chair/recliner  -AR      Post Treatment Position chair  -AR      In Chair sitting;call light within reach;encouraged to call for assist;with family/caregiver;with nsg;with brace  -AR      Recorded by [AR] Kalee Albarado OT        User Key  (r) = Recorded By, (t) = Taken By, (c) = Cosigned By    Initials Name Effective Dates    AR " Kalee Albarado OT 06/22/15 -                 OT Goals       12/05/17 1518 12/04/17 1940       Transfer Training OT LTG    Transfer Training OT LTG, Date Established  12/04/17  -AR     Transfer Training OT LTG, Time to Achieve  by discharge  -AR     Transfer Training OT LTG, Activity Type  sit to stand/stand to sit;toilet  -AR     Transfer Training OT LTG, [place] Level  verbal cues required;contact guard assist  -AR     Transfer Training OT LTG, Outcome  goal met  -AR     Range of Motion OT LTG    Range of Motion Goal OT LTG, Date Established  12/04/17  -AR     Range of Motion Goal OT LTG, Time to Achieve  by discharge  -AR     Range of Motion Goal OT LTG, AROM Measure  Pt and spouse will complete LUE HEP within physician parameters with supervision in preparation for safe DC home.  -AR     Range of Motion Goal OT LTG, Outcome goal met  -AR      Patient Education OT LTG    Patient Education OT LTG, Date Established  12/04/17  -AR     Patient Education OT LTG, Time to Achieve  by discharge  -AR     Patient Education OT LTG, Education Type  written program;HEP;precautions per surgeon;1 hand/niranjan technique;home safety  -AR     Patient Education OT LTG, Education Understanding  demonstrates adequately;verbalizes understanding  -AR     Patient Education OT LTG Outcome goal met  -AR      UB Dressing OT LTG    UB Dressing Goal OT LTG, Date Established  12/04/17  -AR     UB Dressing Goal OT LTG, Time to Achieve  by discharge  -AR     UB Dressing Goal OT LTG, Activity Type  Pt and spouse will don/doff LUE sling with min assist in preparation for safe DC home  -AR     UB Dressing Goal OT LTG, Outcome goal met  -AR        User Key  (r) = Recorded By, (t) = Taken By, (c) = Cosigned By    Initials Name Provider Type    AR Kalee Albarado, OT Occupational Therapist          Occupational Therapy Education     Title: PT OT SLP Therapies (Done)     Topic: Occupational Therapy (Done)     Point: ADL training (Done)     Description: Instruct learner(s) on proper safety adaptation and remediation techniques during self care or transfers.   Instruct in proper use of assistive devices.    Learning Progress Summary    Learner Readiness Method Response Comment Documented by Status   Patient VIDHYA Guadarrama D,H ABISAI,THAD reviewed L shudler precautions, ADL retraining to maintain, care of ON-Q ball with ADLS, LUE HEP, NWB LUE, home safety AR 12/05/17 1516 Done    VIDHYA Guadarrama D,H VU,NR L: shoulder precautions, ADL retraining to maintain, sling management, care of On-Q ball with ADLS, LUE HEP, NWB LUE, bed mobility, transfer training AR 12/04/17 1938 Done   Significant Other VIDHYA Guadarrama D,H ABISAI,THAD reviewed L shudler precautions, ADL retraining to maintain, care of ON-Q ball with ADLS, LUE HEP, NWB LUE, home safety AR 12/05/17 1516 Done    VIDHYA Guadarrama D,H VU,NR L: shoulder precautions, ADL retraining to maintain, sling management, care of On-Q ball with ADLS, LUE HEP, NWB LUE, bed mobility, transfer training AR 12/04/17 1938 Done               Point: Home exercise program (Done)    Description: Instruct learner(s) on appropriate technique for monitoring, assisting and/or progressing therapeutic exercises/activities.    Learning Progress Summary    Learner Readiness Method Response Comment Documented by Status   Patient VIDHYA Guadarrama D,H ABISAI,THAD reviewed L shudler precautions, ADL retraining to maintain, care of ON-Q ball with ADLS, LUE HEP, NWB LUE, home safety AR 12/05/17 1516 Done    VIDHYA Guadarrama D,H VU,NR L: shoulder precautions, ADL retraining to maintain, sling management, care of On-Q ball with ADLS, LUE HEP, NWB LUE, bed mobility, transfer training AR 12/04/17 1938 Done   Significant Other VIDHYA Guadarrama D,H ABISAI,VU reviewed L shudler precautions, ADL retraining to maintain, care of ON-Q ball with ADLS, LUE HEP, NWB LUE, home safety AR 12/05/17 1516 Done    VIDHYA Guadarrama D,H VU,NR L: shoulder precautions, ADL retraining to maintain, sling management, care of On-Q  ball with ADLS, LUE HEP, NWB LUE, bed mobility, transfer training AR 12/04/17 1938 Done               Point: Precautions (Done)    Description: Instruct learner(s) on prescribed precautions during self-care and functional transfers.    Learning Progress Summary    Learner Readiness Method Response Comment Documented by Status   Patient VIDHYA Guadarrama D,H ABISAI,THAD reviewed L shudler precautions, ADL retraining to maintain, care of ON-Q ball with ADLS, LUE HEP, NWB LUE, home safety AR 12/05/17 1516 Done    VIDHYA Guadarrama D,H VU,NR L: shoulder precautions, ADL retraining to maintain, sling management, care of On-Q ball with ADLS, LUE HEP, NWB LUE, bed mobility, transfer training AR 12/04/17 1938 Done   Significant Other VIDHYA Guadarrama D,H ABISAI,THAD reviewed L shudler precautions, ADL retraining to maintain, care of ON-Q ball with ADLS, LUE HEP, NWB LUE, home safety AR 12/05/17 1516 Done    VIDHYA Guadarrama D,H VU,NR L: shoulder precautions, ADL retraining to maintain, sling management, care of On-Q ball with ADLS, LUE HEP, NWB LUE, bed mobility, transfer training AR 12/04/17 1938 Done               Point: Body mechanics (Done)    Description: Instruct learner(s) on proper positioning and spine alignment during self-care, functional mobility activities and/or exercises.    Learning Progress Summary    Learner Readiness Method Response Comment Documented by Status   Patient VIDHYA Guadarrama D,H ABISAI,THAD reviewed L shudler precautions, ADL retraining to maintain, care of ON-Q ball with ADLS, LUE HEP, NWB LUE, home safety AR 12/05/17 1516 Done    VIDHYA Guadarrama D,H VU,NR L: shoulder precautions, ADL retraining to maintain, sling management, care of On-Q ball with ADLS, LUE HEP, NWB LUE, bed mobility, transfer training AR 12/04/17 1938 Done   Significant Other VIDHYA Guadarrama D,H ABISAI,VU reviewed L shudler precautions, ADL retraining to maintain, care of ON-Q ball with ADLS, LUE HEP, NWB LUE, home safety AR 12/05/17 1516 Done    Teofilo OVIEDO,VIDHYA,BRENNON,H THAD,NR L: shoulder  precautions, ADL retraining to maintain, sling management, care of On-Q ball with ADLS, LUE HEP, NWB LUE, bed mobility, transfer training AR 12/04/17 1938 Done                      User Key     Initials Effective Dates Name Provider Type Discipline    AR 06/22/15 -  Kalee Albarado OT Occupational Therapist OT                OT Recommendation and Plan  Anticipated Discharge Disposition: home with assist  Therapy Frequency: daily (per priority policy)  Plan of Care Review  Plan Of Care Reviewed With: patient  Progress: improving  Outcome Summary/Follow up Plan: Pt and spouse anticipate DC home today and report feeling comfortable with this plan. Pt and spouse demonstrated good teachback with LUE HEP and sling management. She tolerated PROM , IR chest and ER 30. ON-Q running at 6. Pt with noticeable left ptosis, nurse notified. No dyspnea or desaturation with ambulation on RA. Spouse will be away overnight next week, educated pt on SROM and self application of sling.           Outcome Measures       12/05/17 1045 12/04/17 1633       How much help from another is currently needed...    Putting on and taking off regular lower body clothing? 2  -AR 2  -AR     Bathing (including washing, rinsing, and drying) 3  -AR 3  -AR     Toileting (which includes using toilet bed pan or urinal) 3  -AR 3  -AR     Putting on and taking off regular upper body clothing 2  -AR 2  -AR     Taking care of personal grooming (such as brushing teeth) 3  -AR 3  -AR     Eating meals 3  -AR 3  -AR     Score 16  -AR 16  -AR     Functional Assessment    Outcome Measure Options AM-PAC 6 Clicks Daily Activity (OT)  -AR AM-PAC 6 Clicks Daily Activity (OT)  -AR       User Key  (r) = Recorded By, (t) = Taken By, (c) = Cosigned By    Initials Name Provider Type    AR Kalee Albarado OT Occupational Therapist           Time Calculation:          Time Calculation- OT       12/05/17 1523          Time Calculation- OT    OT Start Time 1045  -AR       Total Timed Code Minutes- OT 90 minute(s)  -AR      OT Received On 12/05/17  -AR      OT Goal Re-Cert Due Date 12/14/17  -AR        User Key  (r) = Recorded By, (t) = Taken By, (c) = Cosigned By    Initials Name Provider Type    JENA Albarado OT Occupational Therapist          Therapy Charges for Today     Code Description Service Date Service Provider Modifiers Qty    38546441628 HC OT EVAL MOD COMPLEXITY 4 12/4/2017 Kalee Albarado OT GO 1    45045795369 HC OT THER SUPP EA 15 MIN 12/4/2017 Kalee Albarado OT GO 2    78931810266 HC OT THERAPEUTIC ACT EA 15 MIN 12/4/2017 Kalee Albarado OT GO 2    12619628480 HC OT THERAPEUTIC ACT EA 15 MIN 12/5/2017 Kalee Albarado OT GO 6               OT Discharge Summary  Anticipated Discharge Disposition: home with assist  Reason for Discharge: Discharge from facility  Outcomes Achieved: Able to achieve all goals within established timeline  Discharge Destination: Home with assist    Kalee Albarado OT  12/5/2017

## 2017-12-05 NOTE — PROGRESS NOTES
[facility]    Acute pain service Inpatient Progress Note    Patient Name: Cheryle Newsome  :  1940  MRN:  [id number]        Acute Pain  Service Inpatient Progress Note:    Analgesia:Excellent  Pain Score:2/10  LOC: alert and awake  Resp Status: room air  Cardiac: VS stable  Side Effects:None  Catheter Site:clean  Cath type: peripheral nerve cath with ON Q  Infusion rate: 6ml/hr  Catheter Plan:Catheter to remain Insitu and Continue catheter infusion rate unchanged

## 2017-12-05 NOTE — PLAN OF CARE
Problem: Patient Care Overview (Adult)  Goal: Plan of Care Review  Outcome: Ongoing (interventions implemented as appropriate)    12/05/17 1518   Coping/Psychosocial Response Interventions   Plan Of Care Reviewed With patient   Patient Care Overview   Progress improving   Outcome Evaluation   Outcome Summary/Follow up Plan Pt and spouse anticipate DC home today and report feeling comfortable with this plan. Pt and spouse demonstrated good teachback with LUE HEP and sling management. She tolerated PROM , IR chest and ER 30. ON-Q running at 6. Pt with noticeable left ptosis, nurse notified. No dyspnea or desaturation with ambulation on RA. Spouse will be away overnight next week, educated pt on SROM and self application of sling.          Problem: Inpatient Occupational Therapy  Goal: Range of Motion Goal LTG- OT  Outcome: Outcome(s) achieved Date Met:  12/05/17 12/04/17 1940 12/05/17 1518   Range of Motion OT LTG   Range of Motion Goal OT LTG, Date Established 12/04/17 --    Range of Motion Goal OT LTG, Time to Achieve by discharge --    Range of Motion Goal OT LTG, AROM Measure Pt and spouse will complete LUE HEP within physician parameters with supervision in preparation for safe DC home. --    Range of Motion Goal OT LTG, Outcome --  goal met       Goal: Patient Education Goal LTG- OT  Outcome: Outcome(s) achieved Date Met:  12/05/17 12/04/17 1940 12/05/17 1518   Patient Education OT LTG   Patient Education OT LTG, Date Established 12/04/17 --    Patient Education OT LTG, Time to Achieve by discharge --    Patient Education OT LTG, Education Type written program;HEP;precautions per surgeon;1 hand/niranjan technique;home safety --    Patient Education OT LTG, Education Understanding demonstrates adequately;verbalizes understanding --    Patient Education OT LTG Outcome --  goal met       Goal: UB Dressing Goal LTG- OT  Outcome: Outcome(s) achieved Date Met:  12/05/17 12/04/17 1940 12/05/17 1518   UB  Dressing OT LTG   UB Dressing Goal OT LTG, Date Established 12/04/17 --    UB Dressing Goal OT LTG, Time to Achieve by discharge --    UB Dressing Goal OT LTG, Activity Type Pt and spouse will don/doff LUE sling with min assist in preparation for safe DC home --    UB Dressing Goal OT LTG, Outcome --  goal met

## 2017-12-05 NOTE — PROGRESS NOTES
Orthopedic Daily Progress Note      CC: None    Pain well controlled  General: no fevers, chills  Abdomen: no nausea, vomiting, or diarrhea    No other complaints    Physical Exam:  I have reviewed the vital signs.  Temp:  [96.5 °F (35.8 °C)-98.2 °F (36.8 °C)] 97.7 °F (36.5 °C)  Heart Rate:  [57-88] 88  Resp:  [16-20] 16  BP: (143-172)/(65-96) 170/85    Objective  General Appearance:    Alert, cooperative, no distress  Extremities: No clubbing, cyanosis, or edema to lower extremities  Pulses:  2+ in distal surgical extremity  Skin: Dressing Clean/dry/intact      Results Review:    I have reviewed the labs, radiology results and diagnostic studies:yes      Results from last 7 days  Lab Units 12/05/17  0606   WBC 10*3/mm3 8.46   HEMOGLOBIN g/dL 12.6   PLATELETS 10*3/mm3 208           I have reviewed the medications.    Assessment/Problem List  POD# 1 S/p Left reverse TSA    Plan  PT/OT  D/c to home      Discharge Planning: I expect patient to be discharged to home today.    Carl Jarrett MD  12/05/17  7:44 AM

## 2017-12-05 NOTE — DISCHARGE SUMMARY
Patient Name: Cheryle Newsome  MRN: [id number]  : 1940  DOS: 2017    Attending: Carl Jarrett MD    Primary Care Provider: Chivo Martini MD    Date of Admission:.2017  5:27 AM    Date of Discharge:  2017    Discharge Diagnosis: Principal Problem:    S/P reverse total shoulder arthroplasty, left  Active Problems:    HLD (hyperlipidemia)    Prediabetes    Osteoarthritis, previous bilateral total knee arthroplasties and bilateral hip arthroplasties    Hospital Course  Patient is a 77 y.o. female presented for Left reverse total shoulder arthroplasty by Dr. Jarrett under [place]. She tolerated surgery well and was admitted for further medical management.  Her shoulder has been painful for many years.  She has had limited range of motion.  Conservative treatments failed to provide long-term pain relief.      Patient was provided pain medications as needed for pain control, along with interscalene nerve block infusion of Ropivacaine.      The patient was seen by OT and was taught exercises for her left arm.  The patient used an IS for atelectasis prophylaxis and mechanicals for DVT prophylaxis.  Home medications were resumed as appropriate, and labs were monitored and remained fairly stable.     With the progress she has made, she is ready for DC home today.      The patient will have an On Q pump ( instructed on it during this admit)  Discussed with patient regarding plan and she shows understanding and agreement.        Procedures Performed  DATE OF OPERATION: 17  PREOPERATIVE DIAGNOSIS: Left shoulder arthritis.    POSTOPERATIVE DIAGNOSES:  1. Left shoulder osteoarthritis with severe posterior glenoid wear  2. Biceps tenosynovitis.    PROCEDURES PERFORMED:  1. Left reverse total shoulder arthroplasty.    2. Left biceps tenodesis.    SURGEON: Carl Jarrett MD       Pertinent Test Results:    I reviewed the patient's new clinical results.     Results from last 7 days  Lab Units  "17  0606   WBC 10*3/mm3 8.46   HEMOGLOBIN g/dL 12.6   HEMATOCRIT % 40.2   PLATELETS 10*3/mm3 208     Results for CHERYLE MEYER (MRN [id number]) as of 2017 10:50   Ref. Range 2017 17:10 2017 20:44 2017 06:06 2017 07:00   Glucose Latest Ref Range: 70 - 130 mg/dL 244 (H) 195 (H)  114     I reviewed the patient's new imaging including images and reports.      Physical therapy: Pt c/o pain 1/10 at axilla region and ON-Q running at 6. No desaturation on RA post ambulation. Pt passed mobility screen, please DC PT order and pt in agreement. Pt tolerated PROM , IR chest and ER 30 with good teachback from spouse. Recommend DC home with assist from spouse when medically ready.     Discharge Assessment:    Vital Signs  /85  Pulse 88  Temp 97.7 °F (36.5 °C)  Resp 16  Ht 160 cm (63\")  Wt 93.9 kg (207 lb)  SpO2 94%  BMI 36.67 kg/m2  Temp (24hrs), Av.9 °F (36.6 °C), Min:96.5 °F (35.8 °C), Max:98.2 °F (36.8 °C)      General Appearance:    Alert, cooperative, in no acute distress   Lungs:     Clear to auscultation,respirations regular, even and                   unlabored    Heart:    Regular rhythm and normal rate, normal S1 and S2   Abdomen:     Normal bowel sounds, no masses, no organomegaly, soft        non-tender, non-distended, no guarding, no rebound                 tenderness   Extremities:   Left shoulder Aquacel clean dry intact.  LUE in sling.  Good movement cap refill left digits.   Pulses:   Pulses palpable and equal bilaterally   Skin:   No bleeding, bruising or rash   Neurologic:   Cranial nerves 2 - 12 grossly intact, sensation intact       Discharge Disposition: Home    Discharge Medications   Cheryle Meyer   Home Medication Instructions STUART:[id number]    Printed on:17 1216   Medication Information                      docusate sodium 100 MG capsule  Take 1 capsule by mouth 2 (Two) Times a Day As Needed for Constipation.           "   HYDROcodone-acetaminophen (NORCO)  MG per tablet  Take 1 tablet by mouth Every 4 (Four) Hours As Needed for Moderate Pain for up to 9 days.             pravastatin (PRAVACHOL) 40 MG tablet  Take 40 mg by mouth Daily.             Ropivacaine HCl-NaCl (NAROPIN) 0.2-0.9 %  12 mg/hr by Peripheral Nerve route Continuous.                 Discharge Diet: Consistent carb diet    Activity at Discharge: LUE sling, pendulum exercises    Follow-up Appointments  Dr. Jarrett per his orders    Seen and examined by me. Agree with above. Discussed with patient and her .monica Hooker MD  12/05/17  12:15 PM

## 2017-12-05 NOTE — THERAPY EVALUATION
Acute Care - Occupational Therapy Initial Evaluation  [facility]     Patient Name: Cheryle Newsome  : 1940  MRN: [id number]  Today's Date: 2017  Onset of Illness/Injury or Date of Surgery Date: 17  Date of Referral to OT: 17  Referring Physician: Dr. Jarrett    Admit Date: 2017       ICD-10-CM ICD-9-CM   1. Impaired mobility and ADLs Z74.09 799.89     Patient Active Problem List   Diagnosis   • S/P reverse total shoulder arthroplasty, left   • HLD (hyperlipidemia)   • Prediabetes     Past Medical History:   Diagnosis Date   • Arthritis    • High cholesterol    • History of depression     Related to anesthesia    • History of urinary tract surgery    • Obesity    • Osteoarthritis      Past Surgical History:   Procedure Laterality Date   • HYSTERECTOMY     • JOINT REPLACEMENT Bilateral     Hips and Knees          OT ASSESSMENT FLOWSHEET (last 72 hours)      OT Evaluation       17 1633 17 1252 17 0714          Rehab Evaluation    Document Type evaluation;therapy note (daily note)  -AR        Subjective Information no complaints;agree to therapy  -AR        Patient Effort, Rehab Treatment excellent  -AR        Symptoms Noted During/After Treatment none  -AR        General Information    Patient Profile Review yes  -AR        Onset of Illness/Injury or Date of Surgery Date 17  -AR        Referring Physician Dr. Jarrett  -AR        General Observations Pt up in chair, spouse prsent for teaching  -AR        Pertinent History Of Current Problem Pt is a 77 yof admitted for surgical management of progressive left shoudler pain and dysfunction that has failed to improve with conservative measures. Pt is POD#0 left RTSA and OT orders indicate PROM FE no limit, IR chest, ER 30; AROM elbow/wrist/hand and scapular retractions. PTA, pt reports difficulty reaching overhead to complete grooming, she required assist from spouse to don shirts and bra. Sleep was impaired by pain.    -AR        Precautions/Limitations fall precautions;non-weight bearing status;shoulder precautions;other (see comments);insensate limb   interscalene nerve catheter  -AR        Prior Level of Function independent:;all household mobility;community mobility;gait;transfer;min assist:;ADL's;bathing;dressing  -AR        Equipment Currently Used at Home cane, straight;raised toilet;walker, rolling   was not using AD PTA  -AR  raised toilet  -NM      Plans/Goals Discussed With patient and family;agreed upon  -AR        Risks Reviewed patient and family:;LOB;nausea/vomiting;dizziness;increased discomfort;change in vital signs;increased drainage;lines disloged  -AR        Benefits Reviewed patient and family:;improve function;increase independence;increase strength;increase balance;decrease pain;decrease risk of DVT;increase knowledge  -AR        Barriers to Rehab none identified  -AR        Living Environment    Lives With spouse  -AR  spouse  -NM      Living Arrangements house  -AR  house  -NM      Home Accessibility stairs to enter home;stairs within home;bed and bath are not on the first floor  -AR        Number of Stairs to Enter Home 5  -AR        Number of Stairs Within Home 5  -AR        Stair Railings at Home inside, present at both sides;outside, present at both sides  -AR        Transportation Available car;family or friend will provide  -AR  car;family or friend will provide  -NM      Living Environment Comment Pt lives spouse who can assist as needed  -AR        Clinical Impression    Date of Referral to OT 12/04/17  -AR        OT Diagnosis decreased independence with ADLs  -AR        Patient/Family Goals Statement raise LUE overhead  -AR        Criteria for Skilled Therapeutic Interventions Met yes;treatment indicated  -AR        Rehab Potential good, to achieve stated therapy goals  -AR        Therapy Frequency daily   per priority policy  -AR        Anticipated Discharge Disposition home with assist  -AR         Functional Level Prior    Ambulation  0-->independent  -JA 0-->independent  -NM      Transferring  0-->independent  -JA 0-->independent  -NM      Toileting  0-->independent  -JA 0-->independent  -NM      Bathing  0-->independent  -JA 2-->assistive person  -NM      Dressing  0-->independent  -JA 2-->assistive person  -NM      Eating  0-->independent  -JA 0-->independent  -NM      Communication  0-->understands/communicates without difficulty  -JA 0-->understands/communicates without difficulty  -NM      Swallowing  0-->swallows foods/liquids without difficulty  -JA 0-->swallows foods/liquids without difficulty  -NM      Prior Functional Level Comment  independent  -JA       Vital Signs    Intra SpO2 (%) 95  -AR        O2 Delivery Intra Treatment room air   post ambulation  -AR        Pain Assessment    Pain Assessment 0-10  -AR        Pain Score 1  -AR        Post Pain Score 1  -AR        Pain Type Acute pain  -AR        Pain Location Shoulder  -AR        Pain Orientation Left;Other (Comment)   axilla  -AR        Pain Intervention(s) Repositioned;Ambulation/increased activity  -AR        Response to Interventions tolerated  -AR        Vision Assessment/Intervention    Visual Impairment WFL with corrective lenses  -AR        Cognitive Assessment/Intervention    Current Cognitive/Communication Assessment functional  -AR        Orientation Status oriented x 4  -AR        Follows Commands/Answers Questions 100% of the time;able to follow multi-step instructions  -AR        Personal Safety WNL/WFL  -AR        Personal Safety Interventions gait belt;nonskid shoes/slippers when out of bed;supervised activity  -AR        ROM (Range of Motion)    General ROM upper extremity range of motion deficits identified;other (see comments)   FREEDOME impaired- , see below for LUE  -AR        MMT (Manual Muscle Testing)    General MMT Assessment upper extremity strength deficits identified   R deepika MMT 3-/5, LUE deferred  -AR         Mobility Assessment/Training    Extremity Weight-Bearing Status left upper extremity  -AR        Left Upper Extremity Weight-Bearing non weight-bearing  -AR        Bed Mobility, Assessment/Treatment    Bed Mobility, Comment not observed, educated pt on safe sleeping positions and importance of maintaining NWB LUE with bed mobility  -AR        Transfer Assessment/Treatment    Transfers, Sit-Stand Falls supervision required  -AR        Transfers, Stand-Sit Falls supervision required  -AR        Toilet Transfer, Falls supervision required  -AR        Toilet Transfer, Assistive Device elevated toilet seat  -AR        Functional Mobility    Functional Mobility- Ind. Level supervision required  -AR        Functional Mobility-Distance (Feet) 250  -AR        Functional Mobility- Comment No dyspnea, oxygen saturation 95 post ambulation. Pt passed mobility screen with score 22, please DC PT order and pt in agreement.   -AR        Upper Body Bathing Assessment/Training    UB Bathing Assess/Train, Comment Educated pt/spouse on L shoulder precautions and axilla care to maintain  -AR        Upper Body Dressing Assessment/Training    UB Dressing Assess/Train, Clothing Type doffing:;donning:   sling  -AR        UB Dressing Assess/Train, Assist Device niranjan technique  -AR        UB Dressing Assess/Train, Position sitting  -AR        UB Dressing Assess/Train, Falls maximum assist (25% patient effort);verbal cues required  -AR        UB Dressing Assess/Train, Impairments ROM decreased;sensation decreased;strength decreased;pain;other (see comments)   L shoudler precautions  -AR        UB Dressing Assess/Train, Comment Educated pt/spouse on L shoulder precautions, ADL retraining to maintain, sling management including wear schedule, proper fit and application as well as care of On-Q ball with ADLS. Pt and spouse donned/doffed sling with mod assist post teaching.   -AR        Toileting Assessment/Training     Toileting Assess/Train, Assistive Device raised toilet seat  -AR        Toileting Assess/Train, Position sitting;standing  -AR        Toileting Assess/Train, Indepen Level contact guard assist  -AR        Grooming Assessment/Training    Grooming Assess/Train, Position standing  -AR        Grooming Assess/Train, Indepen Level contact guard assist  -AR        Therapy Exercises    Left Upper Extremity AROM:;10 reps;supine;shoulder protraction/retraction;hand pumps;AAROM:;elbow flexion/extension;pronation/supination;PROM:;shoulder extension/flexion;shoulder ER/IR   tolerated PROM , IR chest and ER 30  -AR        Exercise Protocols --   issued/reviewed LUE HEP, pt/spouse completed with CGA  -AR        Sensory Assessment/Intervention    Sensory Impairment numbness  -AR        Light Touch LUE  -AR        LUE Light Touch moderate impairment  -AR        General Therapy Interventions    ADL Retraining Reviewed L shojeromyler precautiuons and ALD retraining to maintain, sling management, care of On-Q ball with ADLS  -AR        Home Exercise Program issued/reviewed LUE HEP  -AR        Transfer Training reviewed transfer training   -AR        Positioning and Restraints    Pre-Treatment Position sitting in chair/recliner  -AR        Post Treatment Position chair  -AR        In Chair notified nsg;reclined;call light within reach;encouraged to call for assist;with family/caregiver;with brace;legs elevated  -AR          User Key  (r) = Recorded By, (t) = Taken By, (c) = Cosigned By    Initials Name Effective Dates    AR Kalee Albarado OT 06/22/15 -     ELI Amin RN 06/16/16 -     LORNA Rodriguez RN 06/16/16 -            Occupational Therapy Education     Title: PT OT SLP Therapies (Done)     Topic: Occupational Therapy (Done)     Point: ADL training (Done)    Description: Instruct learner(s) on proper safety adaptation and remediation techniques during self care or transfers.   Instruct in proper use of  assistive devices.    Learning Progress Summary    Learner Readiness Method Response Comment Documented by Status   Patient Eager E,TB,D,H VU,NR L: shoulder precautions, ADL retraining to maintain, sling management, care of On-Q ball with ADLS, LUE HEP, NWB LUE, bed mobility, transfer training AR 12/04/17 1938 Done   Significant Other Santhosher E,TB,D,H VU,NR L: shoulder precautions, ADL retraining to maintain, sling management, care of On-Q ball with ADLS, LUE HEP, NWB LUE, bed mobility, transfer training AR 12/04/17 1938 Done               Point: Home exercise program (Done)    Description: Instruct learner(s) on appropriate technique for monitoring, assisting and/or progressing therapeutic exercises/activities.    Learning Progress Summary    Learner Readiness Method Response Comment Documented by Status   Patient Santhosher E,TB,D,H VU,NR L: shoulder precautions, ADL retraining to maintain, sling management, care of On-Q ball with ADLS, LUE HEP, NWB LUE, bed mobility, transfer training AR 12/04/17 1938 Done   Significant Other Santhosher IVELISSE,TB,D,H VU,NR L: shoulder precautions, ADL retraining to maintain, sling management, care of On-Q ball with ADLS, LUE HEP, NWB LUE, bed mobility, transfer training AR 12/04/17 1938 Done               Point: Precautions (Done)    Description: Instruct learner(s) on prescribed precautions during self-care and functional transfers.    Learning Progress Summary    Learner Readiness Method Response Comment Documented by Status   Patient Santhosher E,TB,D,H VU,NR L: shoulder precautions, ADL retraining to maintain, sling management, care of On-Q ball with ADLS, LUE HEP, NWB LUE, bed mobility, transfer training AR 12/04/17 1938 Done   Significant Other Santhosher E,TB,D,H VU,NR L: shoulder precautions, ADL retraining to maintain, sling management, care of On-Q ball with ADLS, LUE HEP, NWB LUE, bed mobility, transfer training AR 12/04/17 1938 Done               Point: Body mechanics (Done)    Description:  Instruct learner(s) on proper positioning and spine alignment during self-care, functional mobility activities and/or exercises.    Learning Progress Summary    Learner Readiness Method Response Comment Documented by Status   Patient Santhosher IVELISSE,VIDHYA,BRENNON,H THAD,NR L: shoulder precautions, ADL retraining to maintain, sling management, care of On-Q ball with ADLS, LUE HEP, NWB LUE, bed mobility, transfer training AR 12/04/17 1938 Done   Significant Other Eager E,VIDHYA,BRENNON,H THAD,NR L: shoulder precautions, ADL retraining to maintain, sling management, care of On-Q ball with ADLS, LUE HEP, NWB LUE, bed mobility, transfer training AR 12/04/17 1938 Done                      User Key     Initials Effective Dates Name Provider Type Discipline    AR 06/22/15 -  Kalee Albarado, OT Occupational Therapist OT                  OT Recommendation and Plan  Anticipated Discharge Disposition: home with assist  Therapy Frequency: daily (per priority policy)  Plan of Care Review  Plan Of Care Reviewed With: patient, spouse  Progress: improving  Outcome Summary/Follow up Plan: Pt c/o pain 1/10 at axilla region and ON-Q running at 6. No desaturation on RA post ambulation. Pt passed mobility screen, please DC PT order and pt in agreement. Pt tolerated PROM , IR chest and ER 30 with good teachback from spouse. Recommend DC home with assist from spouse when medically ready.           OT Goals       12/04/17 1940          Transfer Training OT LTG    Transfer Training OT LTG, Date Established 12/04/17  -AR      Transfer Training OT LTG, Time to Achieve by discharge  -AR      Transfer Training OT LTG, Activity Type sit to stand/stand to sit;toilet  -AR      Transfer Training OT LTG, Bond Level verbal cues required;contact guard assist  -AR      Transfer Training OT LTG, Outcome goal met  -AR      Range of Motion OT LTG    Range of Motion Goal OT LTG, Date Established 12/04/17  -AR      Range of Motion Goal OT LTG, Time to Achieve by  discharge  -AR      Range of Motion Goal OT LTG, AROM Measure Pt and spouse will complete LUE HEP within physician parameters with supervision in preparation for safe DC home.  -AR      Patient Education OT LTG    Patient Education OT LTG, Date Established 12/04/17  -AR      Patient Education OT LTG, Time to Achieve by discharge  -AR      Patient Education OT LTG, Education Type written program;HEP;precautions per surgeon;1 hand/niranjan technique;home safety  -AR      Patient Education OT LTG, Education Understanding demonstrates adequately;verbalizes understanding  -AR      UB Dressing OT LTG    UB Dressing Goal OT LTG, Date Established 12/04/17  -AR      UB Dressing Goal OT LTG, Time to Achieve by discharge  -AR      UB Dressing Goal OT LTG, Activity Type Pt and spouse will don/doff LUE sling with min assist in preparation for safe DC home  -AR        User Key  (r) = Recorded By, (t) = Taken By, (c) = Cosigned By    Initials Name Provider Type    JENA Albarado OT Occupational Therapist                Outcome Measures       12/04/17 1633          How much help from another is currently needed...    Putting on and taking off regular lower body clothing? 2  -AR      Bathing (including washing, rinsing, and drying) 3  -AR      Toileting (which includes using toilet bed pan or urinal) 3  -AR      Putting on and taking off regular upper body clothing 2  -AR      Taking care of personal grooming (such as brushing teeth) 3  -AR      Eating meals 3  -AR      Score 16  -AR      Functional Assessment    Outcome Measure Options AM-PAC 6 Clicks Daily Activity (OT)  -AR        User Key  (r) = Recorded By, (t) = Taken By, (c) = Cosigned By    Initials Name Provider Type    JENA Albarado OT Occupational Therapist          Time Calculation:   OT Start Time: 1633    Therapy Charges for Today     Code Description Service Date Service Provider Modifiers Qty    81266935897 HC OT EVAL MOD COMPLEXITY 4 12/4/2017 Kalee  Shannon Albarado, OT GO 1    69979977609 HC OT THER SUPP EA 15 MIN 12/4/2017 Kalee Albarado OT GO 2    26203725770 HC OT THERAPEUTIC ACT EA 15 MIN 12/4/2017 Kalee Albarado OT GO 2               Kalee Albarado OT  12/4/2017

## 2017-12-05 NOTE — PROGRESS NOTES
Discharge Planning Assessment  Psychiatric     Patient Name: Cheryle Newsome  MRN: [id number]  Today's Date: 12/5/2017    Admit Date: 12/4/2017          Discharge Needs Assessment       12/05/17 1223    Living Environment    Lives With spouse    Living Arrangements house    Home Accessibility no concerns    Type of Financial/Environmental Concern none    Transportation Available car;family or friend will provide    Living Environment    Provides Primary Care For no one    Primary Care Provided By spouse/significant other    Quality Of Family Relationships supportive;helpful;involved    Able to Return to Prior Living Arrangements yes    Discharge Needs Assessment    Concerns To Be Addressed no discharge needs identified;denies needs/concerns at this time    Readmission Within The Last 30 Days no previous admission in last 30 days    Anticipated Changes Related to Illness inability to care for self    Equipment Currently Used at Home cane, straight;raised toilet;walker, rolling    Equipment Needed After Discharge none    Discharge Disposition home or self-care            Discharge Plan       12/05/17 1230    Case Management/Social Work Plan    Plan Home with      Patient/Family In Agreement With Plan yes    Additional Comments Met with patient and  at bedside. Patient already has straight cane, rolling walker, and raised toilet seat at home. Discussed home health/PT, but patient decided that these services would not be needed at this time.  will transport patient home by car at time of discharge and provide care and support at home. Carolyn Peterson RN [phone].        Discharge Placement     No information found        Expected Discharge Date and Time     Expected Discharge Date Expected Discharge Time    Dec 5, 2017               Demographic Summary       12/05/17 1219    Referral Information    Arrived From admitted as an inpatient    Referral Source admission list    Reason For Consult discharge  planning    Record Reviewed medical record    Contact Information    Permission Granted to Share Information With     Primary Care Physician Information    Name Chivo Martini MD            Functional Status       12/05/17 1221    Functional Status Prior    Ambulation 0-->independent    Transferring 0-->independent    Toileting 0-->independent    Bathing 0-->independent    Dressing 0-->independent    Eating 0-->independent    Communication 0-->understands/communicates without difficulty    Swallowing 0-->swallows foods/liquids without difficulty    IADL    Medications independent    Meal Preparation independent    Housekeeping independent    Laundry independent    Shopping independent    Oral Care independent    Activity Tolerance    Current Activity Limitations non-weight bearing, upper extremity left    Usual Activity Tolerance moderate    Current Activity Tolerance fair    Employment/Financial    Financial Concerns none    Employment/Finance Comments Medicare A/[employer] [place] PPO; Patient reports she is able to afford all of her medications.            Psychosocial     None            Abuse/Neglect     None            Legal     None            Substance Abuse     None            Patient Forms     None          Carolyn Peterson RN

## 2017-12-06 NOTE — PROGRESS NOTES
ROMA Martinez    Nerve Cath Post Op Call    Patient Name: Cheryle Newsome  :  1940  MRN:  [id number]  Date of Discharge: 2017    Nerve Cath Post Op Call:    Analgesia:Good  Pain Score:3/10  Catheter Site:clean  Patient Controlled ON Q pump infusion rate: 8ml/hr  Catheter Plan:Will continue with plan at home without changes and The patient was instructed to call ON CALL Anesthesia provider for any questions or problems

## 2017-12-07 NOTE — PROGRESS NOTES
ROMA Martinez    Nerve Cath Post Op Call    Patient Name: Cheryle Newsome  :  1940  MRN:  [id number]  Date of Discharge: 2017    Nerve Cath Post Op Call:    Catheter Plan:Patient called/No answer/Message left to call [facility] pain service for any questions or complaints

## 2017-12-08 NOTE — PROGRESS NOTES
ROMA Martinez    Nerve Cath Post Op Call    Patient Name: Cheryle Newsome  :  1940  MRN:  [id number]  Date of Discharge: 2017    Nerve Cath Post Op Call:    Analgesia:Good  Side Effects:None      Patient called service to let us know the she had removed her nerve catheter.  She was doing well.

## (undated) DEVICE — SUT MONOCRYL PLS ANTIB UND 3/0  PS1 27IN

## (undated) DEVICE — ADAPT ST INFUS ADMIN SYR 70IN

## (undated) DEVICE — 3 BONE CEMENT MIXER: Brand: MIXEVAC

## (undated) DEVICE — CVR HNDL LT SURG ACCSSRY BLU STRL

## (undated) DEVICE — POSTN HD UNIV

## (undated) DEVICE — KT PUMP PAIN ONQ CBLOC SELCTAFLO 400ML

## (undated) DEVICE — GLV SURG SIGNATURE TOUCH PF LTX 8 STRL BX/50

## (undated) DEVICE — FLTR HME STR UNIV W/SMPL PORT

## (undated) DEVICE — ANTIBACTERIAL UNDYED BRAIDED (POLYGLACTIN 910), SYNTHETIC ABSORBABLE SUTURE: Brand: COATED VICRYL

## (undated) DEVICE — SLNG ULTRSLING2 11TO13IN MD

## (undated) DEVICE — SKIN AFFIX SURG ADHESIVE 72/CS 0.55ML: Brand: MEDLINE

## (undated) DEVICE — DRAPE,SHOULDER,BEACH CHAIR,STERILE: Brand: MEDLINE

## (undated) DEVICE — DUAL LUMEN STOMACH TUBE,ANTI-REFLUX VALVE: Brand: SALEM SUMP

## (undated) DEVICE — SST TWIST DRILL, STANDARD, 2.4MM DIA. X 127MM: Brand: MICROAIRE®

## (undated) DEVICE — NERVE BLOCK SUPPORT KIT/BLUE: Brand: MEDLINE INDUSTRIES, INC.

## (undated) DEVICE — ST NERV BLCK CONT CONTIPLEX ECHO CLSD 18G 4IN

## (undated) DEVICE — DRSNG SURG AQUACEL AG 9X15CM

## (undated) DEVICE — GLV SURG TRIUMPH CLASSIC PF LTX 7.5 STRL

## (undated) DEVICE — PK MAJ SHLDR SPLT 10

## (undated) DEVICE — MEDI-VAC NON-CONDUCTIVE SUCTION TUBING: Brand: CARDINAL HEALTH

## (undated) DEVICE — SYR CATH/TIP 50ML 2OZ STRL 1P/U

## (undated) DEVICE — 3M™ TEGADERM™ CHG DRESSING 25/CARTON 4 CARTONS/CASE 1658: Brand: TEGADERM™

## (undated) DEVICE — AIRWY 90MM NO9

## (undated) DEVICE — STRYKER PERFORMANCE SERIES SAGITTAL BLADE: Brand: STRYKER PERFORMANCE SERIES

## (undated) DEVICE — MEDI-VAC YANKAUER SUCTION HANDLE W/BULBOUS TIP: Brand: CARDINAL HEALTH

## (undated) DEVICE — CANNULA,OXY,ADULT,SUPERSOFT,W/7'TUB,UC: Brand: MEDLINE

## (undated) DEVICE — SOL POVIDONE IODINE 10PCT 3/4OZ STRL

## (undated) DEVICE — SOL LR 1000ML

## (undated) DEVICE — INTENDED USE FOR SURGICAL MARKING ON INTACT SKIN, ALSO PROVIDES A PERMANENT METHOD OF IDENTIFYING OBJECTS IN THE OPERATING ROOM: Brand: WRITESITE® REGULAR TIP SKIN MARKER

## (undated) DEVICE — SUT VIC 0 TIES 18IN J912G

## (undated) DEVICE — T4 PULLOVER TOGA, LARGE

## (undated) DEVICE — COVER,MAYO STAND,STERILE: Brand: MEDLINE